# Patient Record
Sex: FEMALE | Race: BLACK OR AFRICAN AMERICAN | URBAN - METROPOLITAN AREA
[De-identification: names, ages, dates, MRNs, and addresses within clinical notes are randomized per-mention and may not be internally consistent; named-entity substitution may affect disease eponyms.]

---

## 2017-01-05 ENCOUNTER — OP HISTORICAL/CONVERTED ENCOUNTER (OUTPATIENT)
Dept: OTHER | Age: 52
End: 2017-01-05

## 2019-03-13 ENCOUNTER — HOSPITAL ENCOUNTER (OUTPATIENT)
Dept: CT IMAGING | Age: 54
Discharge: HOME | End: 2019-03-15
Payer: OTHER GOVERNMENT

## 2019-03-13 ENCOUNTER — HOSPITAL ENCOUNTER (OUTPATIENT)
Age: 54
Discharge: HOME | End: 2019-03-13
Payer: OTHER GOVERNMENT

## 2019-03-13 ENCOUNTER — OFFICE VISIT (OUTPATIENT)
Dept: PRIMARY CARE CLINIC | Age: 54
End: 2019-03-13
Payer: OTHER GOVERNMENT

## 2019-03-13 VITALS
BODY MASS INDEX: 32.22 KG/M2 | DIASTOLIC BLOOD PRESSURE: 82 MMHG | SYSTOLIC BLOOD PRESSURE: 124 MMHG | HEIGHT: 65 IN | WEIGHT: 193.4 LBS | HEART RATE: 87 BPM

## 2019-03-13 DIAGNOSIS — R93.5 ABNORMAL CT SCAN, PELVIS: Primary | ICD-10-CM

## 2019-03-13 DIAGNOSIS — Z13.1 ENCOUNTER FOR SCREENING FOR DIABETES MELLITUS: ICD-10-CM

## 2019-03-13 DIAGNOSIS — R10.2 PELVIC PRESSURE IN FEMALE: ICD-10-CM

## 2019-03-13 DIAGNOSIS — R10.30 LOWER ABDOMINAL PAIN: Primary | ICD-10-CM

## 2019-03-13 DIAGNOSIS — K64.9 BLEEDING HEMORRHOIDS: ICD-10-CM

## 2019-03-13 DIAGNOSIS — Z13.220 SCREENING FOR HYPERLIPIDEMIA: ICD-10-CM

## 2019-03-13 DIAGNOSIS — R10.30 LOWER ABDOMINAL PAIN: ICD-10-CM

## 2019-03-13 DIAGNOSIS — Z12.11 COLON CANCER SCREENING: ICD-10-CM

## 2019-03-13 DIAGNOSIS — Z23 NEED FOR VACCINATION: ICD-10-CM

## 2019-03-13 DIAGNOSIS — Z12.31 ENCOUNTER FOR SCREENING MAMMOGRAM FOR BREAST CANCER: ICD-10-CM

## 2019-03-13 DIAGNOSIS — D39.11 NEOPLASM OF UNCERTAIN BEHAVIOR OF OVARY, RIGHT: ICD-10-CM

## 2019-03-13 LAB
ABSOLUTE EOS #: 0.2 K/UL (ref 0–0.4)
ABSOLUTE IMMATURE GRANULOCYTE: NORMAL K/UL (ref 0–0.3)
ABSOLUTE LYMPH #: 2.3 K/UL (ref 1–4.8)
ABSOLUTE MONO #: 0.5 K/UL (ref 0.1–1.3)
BASOPHILS # BLD: 1 % (ref 0–2)
BASOPHILS ABSOLUTE: 0 K/UL (ref 0–0.2)
BILIRUBIN, POC: NORMAL
BLOOD URINE, POC: NORMAL
BUN BLDV-MCNC: 8 MG/DL (ref 6–20)
CHOLESTEROL, FASTING: 217 MG/DL
CHOLESTEROL/HDL RATIO: 2.8
CLARITY, POC: NORMAL
COLOR, POC: YELLOW
CREAT SERPL-MCNC: 0.65 MG/DL (ref 0.5–0.9)
DIFFERENTIAL TYPE: NORMAL
EOSINOPHILS RELATIVE PERCENT: 3 % (ref 0–4)
GFR AFRICAN AMERICAN: >60 ML/MIN
GFR NON-AFRICAN AMERICAN: >60 ML/MIN
GFR SERPL CREATININE-BSD FRML MDRD: NORMAL ML/MIN/{1.73_M2}
GFR SERPL CREATININE-BSD FRML MDRD: NORMAL ML/MIN/{1.73_M2}
GLUCOSE FASTING: 106 MG/DL (ref 70–99)
GLUCOSE URINE, POC: NORMAL
HCT VFR BLD CALC: 36.7 % (ref 36–46)
HDLC SERPL-MCNC: 77 MG/DL
HEMOGLOBIN: 12 G/DL (ref 12–16)
IMMATURE GRANULOCYTES: NORMAL %
KETONES, POC: NORMAL
LDL CHOLESTEROL: 127 MG/DL (ref 0–130)
LEUKOCYTE EST, POC: NORMAL
LYMPHOCYTES # BLD: 28 % (ref 24–44)
MCH RBC QN AUTO: 28.2 PG (ref 26–34)
MCHC RBC AUTO-ENTMCNC: 32.6 G/DL (ref 31–37)
MCV RBC AUTO: 86.6 FL (ref 80–100)
MONOCYTES # BLD: 5 % (ref 1–7)
NITRITE, POC: NORMAL
NRBC AUTOMATED: NORMAL PER 100 WBC
PDW BLD-RTO: 14.1 % (ref 11.5–14.9)
PH, POC: 5.5
PLATELET # BLD: 325 K/UL (ref 150–450)
PLATELET ESTIMATE: NORMAL
PMV BLD AUTO: 7.9 FL (ref 6–12)
PROTEIN, POC: NORMAL
RBC # BLD: 4.24 M/UL (ref 4–5.2)
RBC # BLD: NORMAL 10*6/UL
SEG NEUTROPHILS: 63 % (ref 36–66)
SEGMENTED NEUTROPHILS ABSOLUTE COUNT: 5.4 K/UL (ref 1.3–9.1)
SPECIFIC GRAVITY, POC: NORMAL
TRIGLYCERIDE, FASTING: 65 MG/DL
UROBILINOGEN, POC: NORMAL
VLDLC SERPL CALC-MCNC: ABNORMAL MG/DL (ref 1–30)
WBC # BLD: 8.4 K/UL (ref 3.5–11)
WBC # BLD: NORMAL 10*3/UL

## 2019-03-13 PROCEDURE — 80061 LIPID PANEL: CPT

## 2019-03-13 PROCEDURE — 82947 ASSAY GLUCOSE BLOOD QUANT: CPT

## 2019-03-13 PROCEDURE — 90471 IMMUNIZATION ADMIN: CPT | Performed by: PHYSICIAN ASSISTANT

## 2019-03-13 PROCEDURE — 2580000003 HC RX 258: Performed by: PHYSICIAN ASSISTANT

## 2019-03-13 PROCEDURE — 74177 CT ABD & PELVIS W/CONTRAST: CPT

## 2019-03-13 PROCEDURE — 85025 COMPLETE CBC W/AUTO DIFF WBC: CPT

## 2019-03-13 PROCEDURE — 36415 COLL VENOUS BLD VENIPUNCTURE: CPT

## 2019-03-13 PROCEDURE — 90715 TDAP VACCINE 7 YRS/> IM: CPT | Performed by: PHYSICIAN ASSISTANT

## 2019-03-13 PROCEDURE — 6360000004 HC RX CONTRAST MEDICATION: Performed by: PHYSICIAN ASSISTANT

## 2019-03-13 PROCEDURE — 84520 ASSAY OF UREA NITROGEN: CPT

## 2019-03-13 PROCEDURE — 99204 OFFICE O/P NEW MOD 45 MIN: CPT | Performed by: PHYSICIAN ASSISTANT

## 2019-03-13 PROCEDURE — 82565 ASSAY OF CREATININE: CPT

## 2019-03-13 PROCEDURE — 81003 URINALYSIS AUTO W/O SCOPE: CPT | Performed by: PHYSICIAN ASSISTANT

## 2019-03-13 RX ORDER — 0.9 % SODIUM CHLORIDE 0.9 %
80 INTRAVENOUS SOLUTION INTRAVENOUS ONCE
Status: COMPLETED | OUTPATIENT
Start: 2019-03-13 | End: 2019-03-13

## 2019-03-13 RX ORDER — SODIUM CHLORIDE 0.9 % (FLUSH) 0.9 %
10 SYRINGE (ML) INJECTION PRN
Status: DISCONTINUED | OUTPATIENT
Start: 2019-03-13 | End: 2019-03-16 | Stop reason: HOSPADM

## 2019-03-13 RX ADMIN — IOHEXOL 50 ML: 240 INJECTION, SOLUTION INTRATHECAL; INTRAVASCULAR; INTRAVENOUS; ORAL at 13:30

## 2019-03-13 RX ADMIN — SODIUM CHLORIDE 80 ML: 9 INJECTION, SOLUTION INTRAVENOUS at 13:30

## 2019-03-13 RX ADMIN — Medication 10 ML: at 13:30

## 2019-03-13 RX ADMIN — IOVERSOL 75 ML: 741 INJECTION INTRA-ARTERIAL; INTRAVENOUS at 13:30

## 2019-03-13 ASSESSMENT — ENCOUNTER SYMPTOMS
BLOOD IN STOOL: 1
CONSTIPATION: 0
DIARRHEA: 0
NAUSEA: 1
VOMITING: 1
BACK PAIN: 1
RESPIRATORY NEGATIVE: 1
ABDOMINAL PAIN: 1

## 2019-03-13 ASSESSMENT — PATIENT HEALTH QUESTIONNAIRE - PHQ9
SUM OF ALL RESPONSES TO PHQ9 QUESTIONS 1 & 2: 0
SUM OF ALL RESPONSES TO PHQ QUESTIONS 1-9: 0
SUM OF ALL RESPONSES TO PHQ QUESTIONS 1-9: 0
2. FEELING DOWN, DEPRESSED OR HOPELESS: 0
1. LITTLE INTEREST OR PLEASURE IN DOING THINGS: 0

## 2019-03-15 ENCOUNTER — APPOINTMENT (OUTPATIENT)
Dept: CT IMAGING | Age: 54
End: 2019-03-15
Payer: OTHER GOVERNMENT

## 2019-03-15 ENCOUNTER — HOSPITAL ENCOUNTER (OUTPATIENT)
Dept: ULTRASOUND IMAGING | Age: 54
Discharge: HOME | End: 2019-03-17
Payer: OTHER GOVERNMENT

## 2019-03-15 ENCOUNTER — HOSPITAL ENCOUNTER (EMERGENCY)
Age: 54
Discharge: HOME | End: 2019-03-15
Attending: EMERGENCY MEDICINE
Payer: OTHER GOVERNMENT

## 2019-03-15 ENCOUNTER — TELEPHONE (OUTPATIENT)
Dept: PRIMARY CARE CLINIC | Age: 54
End: 2019-03-15

## 2019-03-15 VITALS
TEMPERATURE: 98.8 F | OXYGEN SATURATION: 100 % | SYSTOLIC BLOOD PRESSURE: 132 MMHG | DIASTOLIC BLOOD PRESSURE: 83 MMHG | HEART RATE: 75 BPM | HEIGHT: 65 IN | WEIGHT: 192 LBS | RESPIRATION RATE: 19 BRPM | BODY MASS INDEX: 31.99 KG/M2

## 2019-03-15 DIAGNOSIS — D39.11 NEOPLASM OF UNCERTAIN BEHAVIOR OF OVARY, RIGHT: ICD-10-CM

## 2019-03-15 DIAGNOSIS — R10.30 LOWER ABDOMINAL PAIN: ICD-10-CM

## 2019-03-15 DIAGNOSIS — R93.5 ABNORMAL CT SCAN, PELVIS: ICD-10-CM

## 2019-03-15 DIAGNOSIS — R10.2 PELVIC PRESSURE IN FEMALE: ICD-10-CM

## 2019-03-15 DIAGNOSIS — N83.209 RUPTURED OVARIAN CYST: Primary | ICD-10-CM

## 2019-03-15 DIAGNOSIS — K59.00 CONSTIPATION, UNSPECIFIED CONSTIPATION TYPE: ICD-10-CM

## 2019-03-15 LAB
ABO/RH: NORMAL
ABSOLUTE EOS #: 0.2 K/UL (ref 0–0.4)
ABSOLUTE IMMATURE GRANULOCYTE: ABNORMAL K/UL (ref 0–0.3)
ABSOLUTE LYMPH #: 1.8 K/UL (ref 1–4.8)
ABSOLUTE MONO #: 0.3 K/UL (ref 0.1–1.3)
ALBUMIN SERPL-MCNC: 4 G/DL (ref 3.5–5.2)
ALBUMIN/GLOBULIN RATIO: ABNORMAL (ref 1–2.5)
ALP BLD-CCNC: 100 U/L (ref 35–104)
ALT SERPL-CCNC: 11 U/L (ref 5–33)
ANION GAP SERPL CALCULATED.3IONS-SCNC: 10 MMOL/L (ref 9–17)
ANTIBODY SCREEN: NEGATIVE
ARM BAND NUMBER: NORMAL
AST SERPL-CCNC: 12 U/L
BASOPHILS # BLD: 1 % (ref 0–2)
BASOPHILS ABSOLUTE: 0.1 K/UL (ref 0–0.2)
BILIRUB SERPL-MCNC: 0.51 MG/DL (ref 0.3–1.2)
BILIRUBIN URINE: NEGATIVE
BLOOD BANK COMMENT: NORMAL
BUN BLDV-MCNC: 8 MG/DL (ref 6–20)
BUN/CREAT BLD: ABNORMAL (ref 9–20)
CALCIUM SERPL-MCNC: 8.8 MG/DL (ref 8.6–10.4)
CHLORIDE BLD-SCNC: 104 MMOL/L (ref 98–107)
CO2: 25 MMOL/L (ref 20–31)
COLOR: YELLOW
COMMENT UA: NORMAL
CREAT SERPL-MCNC: 0.66 MG/DL (ref 0.5–0.9)
DIFFERENTIAL TYPE: ABNORMAL
EOSINOPHILS RELATIVE PERCENT: 3 % (ref 0–4)
EXPIRATION DATE: NORMAL
GFR AFRICAN AMERICAN: >60 ML/MIN
GFR NON-AFRICAN AMERICAN: >60 ML/MIN
GFR SERPL CREATININE-BSD FRML MDRD: ABNORMAL ML/MIN/{1.73_M2}
GFR SERPL CREATININE-BSD FRML MDRD: ABNORMAL ML/MIN/{1.73_M2}
GLUCOSE BLD-MCNC: 105 MG/DL (ref 70–99)
GLUCOSE URINE: NEGATIVE
HCT VFR BLD CALC: 35.5 % (ref 36–46)
HEMOGLOBIN: 11.8 G/DL (ref 12–16)
IMMATURE GRANULOCYTES: ABNORMAL %
KETONES, URINE: NEGATIVE
LEUKOCYTE ESTERASE, URINE: NEGATIVE
LIPASE: 23 U/L (ref 13–60)
LYMPHOCYTES # BLD: 24 % (ref 24–44)
MCH RBC QN AUTO: 28.6 PG (ref 26–34)
MCHC RBC AUTO-ENTMCNC: 33.2 G/DL (ref 31–37)
MCV RBC AUTO: 86.2 FL (ref 80–100)
MONOCYTES # BLD: 5 % (ref 1–7)
NITRITE, URINE: NEGATIVE
NRBC AUTOMATED: ABNORMAL PER 100 WBC
PDW BLD-RTO: 13.9 % (ref 11.5–14.9)
PH UA: 6.5 (ref 5–8)
PLATELET # BLD: 336 K/UL (ref 150–450)
PLATELET ESTIMATE: ABNORMAL
PMV BLD AUTO: 7.7 FL (ref 6–12)
POTASSIUM SERPL-SCNC: 3.8 MMOL/L (ref 3.7–5.3)
PROTEIN UA: NEGATIVE
RBC # BLD: 4.12 M/UL (ref 4–5.2)
RBC # BLD: ABNORMAL 10*6/UL
SEG NEUTROPHILS: 67 % (ref 36–66)
SEGMENTED NEUTROPHILS ABSOLUTE COUNT: 4.9 K/UL (ref 1.3–9.1)
SODIUM BLD-SCNC: 139 MMOL/L (ref 135–144)
SPECIFIC GRAVITY UA: 1.02 (ref 1–1.03)
TOTAL PROTEIN: 6.9 G/DL (ref 6.4–8.3)
TURBIDITY: CLEAR
URINE HGB: NEGATIVE
UROBILINOGEN, URINE: NORMAL
WBC # BLD: 7.3 K/UL (ref 3.5–11)
WBC # BLD: ABNORMAL 10*3/UL

## 2019-03-15 PROCEDURE — 85025 COMPLETE CBC W/AUTO DIFF WBC: CPT

## 2019-03-15 PROCEDURE — 86850 RBC ANTIBODY SCREEN: CPT

## 2019-03-15 PROCEDURE — 86901 BLOOD TYPING SEROLOGIC RH(D): CPT

## 2019-03-15 PROCEDURE — 76830 TRANSVAGINAL US NON-OB: CPT

## 2019-03-15 PROCEDURE — 99284 EMERGENCY DEPT VISIT MOD MDM: CPT

## 2019-03-15 PROCEDURE — 6360000004 HC RX CONTRAST MEDICATION: Performed by: EMERGENCY MEDICINE

## 2019-03-15 PROCEDURE — 36415 COLL VENOUS BLD VENIPUNCTURE: CPT

## 2019-03-15 PROCEDURE — 76856 US EXAM PELVIC COMPLETE: CPT

## 2019-03-15 PROCEDURE — 81003 URINALYSIS AUTO W/O SCOPE: CPT

## 2019-03-15 PROCEDURE — 2580000003 HC RX 258: Performed by: EMERGENCY MEDICINE

## 2019-03-15 PROCEDURE — 80053 COMPREHEN METABOLIC PANEL: CPT

## 2019-03-15 PROCEDURE — 83690 ASSAY OF LIPASE: CPT

## 2019-03-15 PROCEDURE — 96374 THER/PROPH/DIAG INJ IV PUSH: CPT

## 2019-03-15 PROCEDURE — 96375 TX/PRO/DX INJ NEW DRUG ADDON: CPT

## 2019-03-15 PROCEDURE — 74177 CT ABD & PELVIS W/CONTRAST: CPT

## 2019-03-15 PROCEDURE — 86900 BLOOD TYPING SEROLOGIC ABO: CPT

## 2019-03-15 PROCEDURE — 6360000002 HC RX W HCPCS: Performed by: EMERGENCY MEDICINE

## 2019-03-15 RX ORDER — DOXYCYCLINE 100 MG/1
100 TABLET ORAL 2 TIMES DAILY
Qty: 20 TABLET | Refills: 0 | Status: SHIPPED | OUTPATIENT
Start: 2019-03-15 | End: 2019-03-25

## 2019-03-15 RX ORDER — SODIUM CHLORIDE 0.9 % (FLUSH) 0.9 %
10 SYRINGE (ML) INJECTION PRN
Status: DISCONTINUED | OUTPATIENT
Start: 2019-03-15 | End: 2019-03-15 | Stop reason: HOSPADM

## 2019-03-15 RX ORDER — DOCUSATE SODIUM 100 MG/1
100 CAPSULE, LIQUID FILLED ORAL 2 TIMES DAILY
Qty: 30 CAPSULE | Refills: 0 | Status: SHIPPED | OUTPATIENT
Start: 2019-03-15 | End: 2019-03-27

## 2019-03-15 RX ORDER — 0.9 % SODIUM CHLORIDE 0.9 %
80 INTRAVENOUS SOLUTION INTRAVENOUS ONCE
Status: COMPLETED | OUTPATIENT
Start: 2019-03-15 | End: 2019-03-15

## 2019-03-15 RX ORDER — ONDANSETRON 2 MG/ML
4 INJECTION INTRAMUSCULAR; INTRAVENOUS ONCE
Status: COMPLETED | OUTPATIENT
Start: 2019-03-15 | End: 2019-03-15

## 2019-03-15 RX ORDER — MORPHINE SULFATE 4 MG/ML
4 INJECTION, SOLUTION INTRAMUSCULAR; INTRAVENOUS ONCE
Status: COMPLETED | OUTPATIENT
Start: 2019-03-15 | End: 2019-03-15

## 2019-03-15 RX ADMIN — IOVERSOL 75 ML: 741 INJECTION INTRA-ARTERIAL; INTRAVENOUS at 15:06

## 2019-03-15 RX ADMIN — SODIUM CHLORIDE 80 ML: 9 INJECTION, SOLUTION INTRAVENOUS at 15:05

## 2019-03-15 RX ADMIN — MORPHINE SULFATE 4 MG: 4 INJECTION INTRAVENOUS at 14:26

## 2019-03-15 RX ADMIN — Medication 10 ML: at 15:05

## 2019-03-15 RX ADMIN — ONDANSETRON 4 MG: 2 INJECTION INTRAMUSCULAR; INTRAVENOUS at 14:26

## 2019-03-15 ASSESSMENT — PAIN DESCRIPTION - ORIENTATION: ORIENTATION: MID;LOWER

## 2019-03-15 ASSESSMENT — ENCOUNTER SYMPTOMS
BACK PAIN: 0
COUGH: 0
SORE THROAT: 0
NAUSEA: 0
COLOR CHANGE: 0
DIARRHEA: 0
TROUBLE SWALLOWING: 0
SHORTNESS OF BREATH: 0
CONSTIPATION: 0
VOMITING: 0
BLOOD IN STOOL: 0
ABDOMINAL PAIN: 1

## 2019-03-15 ASSESSMENT — PAIN DESCRIPTION - PROGRESSION: CLINICAL_PROGRESSION: GRADUALLY WORSENING

## 2019-03-15 ASSESSMENT — PAIN SCALES - GENERAL
PAINLEVEL_OUTOF10: 4
PAINLEVEL_OUTOF10: 6

## 2019-03-15 ASSESSMENT — PAIN DESCRIPTION - FREQUENCY: FREQUENCY: INTERMITTENT

## 2019-03-15 ASSESSMENT — PAIN DESCRIPTION - DESCRIPTORS: DESCRIPTORS: PRESSURE;HEAVINESS;TIGHTNESS

## 2019-03-15 ASSESSMENT — PAIN DESCRIPTION - PAIN TYPE: TYPE: ACUTE PAIN

## 2019-03-15 ASSESSMENT — PAIN DESCRIPTION - LOCATION: LOCATION: ABDOMEN

## 2019-03-15 ASSESSMENT — PAIN DESCRIPTION - ONSET: ONSET: ON-GOING

## 2019-03-27 ENCOUNTER — OFFICE VISIT (OUTPATIENT)
Dept: PRIMARY CARE CLINIC | Age: 54
End: 2019-03-27
Payer: OTHER GOVERNMENT

## 2019-03-27 ENCOUNTER — HOSPITAL ENCOUNTER (OUTPATIENT)
Dept: GENERAL RADIOLOGY | Age: 54
Discharge: HOME | End: 2019-03-29
Payer: OTHER GOVERNMENT

## 2019-03-27 ENCOUNTER — HOSPITAL ENCOUNTER (OUTPATIENT)
Age: 54
Discharge: HOME | End: 2019-03-29
Payer: OTHER GOVERNMENT

## 2019-03-27 VITALS
BODY MASS INDEX: 32.78 KG/M2 | HEART RATE: 84 BPM | WEIGHT: 197 LBS | SYSTOLIC BLOOD PRESSURE: 132 MMHG | DIASTOLIC BLOOD PRESSURE: 86 MMHG

## 2019-03-27 DIAGNOSIS — R23.2 HOT FLASHES: ICD-10-CM

## 2019-03-27 DIAGNOSIS — N83.201 CYST OF RIGHT OVARY: ICD-10-CM

## 2019-03-27 DIAGNOSIS — N83.209 RUPTURED OVARIAN CYST: ICD-10-CM

## 2019-03-27 DIAGNOSIS — M53.3 COCCYDYNIA: Primary | ICD-10-CM

## 2019-03-27 DIAGNOSIS — M53.3 COCCYDYNIA: ICD-10-CM

## 2019-03-27 DIAGNOSIS — R61 NIGHT SWEATS: ICD-10-CM

## 2019-03-27 PROCEDURE — 99214 OFFICE O/P EST MOD 30 MIN: CPT | Performed by: PHYSICIAN ASSISTANT

## 2019-03-27 PROCEDURE — 72220 X-RAY EXAM SACRUM TAILBONE: CPT

## 2019-03-27 RX ORDER — CELECOXIB 200 MG/1
200 CAPSULE ORAL DAILY
Qty: 60 CAPSULE | Refills: 0 | Status: SHIPPED | OUTPATIENT
Start: 2019-03-27 | End: 2022-07-29

## 2019-03-27 ASSESSMENT — ENCOUNTER SYMPTOMS
BLOOD IN STOOL: 0
VOMITING: 0
CONSTIPATION: 0
ABDOMINAL PAIN: 0
ABDOMINAL DISTENTION: 0
RESPIRATORY NEGATIVE: 1
NAUSEA: 0
DIARRHEA: 0
BACK PAIN: 0

## 2019-03-28 ENCOUNTER — TELEPHONE (OUTPATIENT)
Dept: PRIMARY CARE CLINIC | Age: 54
End: 2019-03-28

## 2019-03-29 ENCOUNTER — HOSPITAL ENCOUNTER (OUTPATIENT)
Age: 54
Discharge: HOME | End: 2019-03-29
Payer: OTHER GOVERNMENT

## 2019-03-29 DIAGNOSIS — R61 NIGHT SWEATS: ICD-10-CM

## 2019-03-29 DIAGNOSIS — R23.2 HOT FLASHES: ICD-10-CM

## 2019-03-29 LAB
ABSOLUTE EOS #: 0.2 K/UL (ref 0–0.4)
ABSOLUTE IMMATURE GRANULOCYTE: NORMAL K/UL (ref 0–0.3)
ABSOLUTE LYMPH #: 1.9 K/UL (ref 1–4.8)
ABSOLUTE MONO #: 0.3 K/UL (ref 0.1–1.3)
BASOPHILS # BLD: 1 % (ref 0–2)
BASOPHILS ABSOLUTE: 0.1 K/UL (ref 0–0.2)
DIFFERENTIAL TYPE: NORMAL
EOSINOPHILS RELATIVE PERCENT: 4 % (ref 0–4)
FOLLICLE STIMULATING HORMONE: 45.8 U/L (ref 1.7–21.5)
HCT VFR BLD CALC: 37 % (ref 36–46)
HEMOGLOBIN: 12.4 G/DL (ref 12–16)
IMMATURE GRANULOCYTES: NORMAL %
LH: 30.7 U/L (ref 1–95.6)
LYMPHOCYTES # BLD: 30 % (ref 24–44)
MCH RBC QN AUTO: 28.9 PG (ref 26–34)
MCHC RBC AUTO-ENTMCNC: 33.4 G/DL (ref 31–37)
MCV RBC AUTO: 86.3 FL (ref 80–100)
MONOCYTES # BLD: 5 % (ref 1–7)
NRBC AUTOMATED: NORMAL PER 100 WBC
PDW BLD-RTO: 14.3 % (ref 11.5–14.9)
PLATELET # BLD: 325 K/UL (ref 150–450)
PLATELET ESTIMATE: NORMAL
PMV BLD AUTO: 7.8 FL (ref 6–12)
RBC # BLD: 4.29 M/UL (ref 4–5.2)
RBC # BLD: NORMAL 10*6/UL
SEG NEUTROPHILS: 60 % (ref 36–66)
SEGMENTED NEUTROPHILS ABSOLUTE COUNT: 3.7 K/UL (ref 1.3–9.1)
TSH SERPL DL<=0.05 MIU/L-ACNC: 1.63 MIU/L (ref 0.3–5)
WBC # BLD: 6.2 K/UL (ref 3.5–11)
WBC # BLD: NORMAL 10*3/UL

## 2019-03-29 PROCEDURE — 83002 ASSAY OF GONADOTROPIN (LH): CPT

## 2019-03-29 PROCEDURE — 83001 ASSAY OF GONADOTROPIN (FSH): CPT

## 2019-03-29 PROCEDURE — 36415 COLL VENOUS BLD VENIPUNCTURE: CPT

## 2019-03-29 PROCEDURE — 84443 ASSAY THYROID STIM HORMONE: CPT

## 2019-03-29 PROCEDURE — 85025 COMPLETE CBC W/AUTO DIFF WBC: CPT

## 2019-04-30 ENCOUNTER — HOSPITAL ENCOUNTER (EMERGENCY)
Age: 54
Discharge: HOME | End: 2019-05-01
Attending: EMERGENCY MEDICINE
Payer: OTHER GOVERNMENT

## 2019-04-30 DIAGNOSIS — R10.32 LEFT LOWER QUADRANT PAIN: Primary | ICD-10-CM

## 2019-04-30 PROCEDURE — 80053 COMPREHEN METABOLIC PANEL: CPT

## 2019-04-30 PROCEDURE — 83690 ASSAY OF LIPASE: CPT

## 2019-04-30 PROCEDURE — 85025 COMPLETE CBC W/AUTO DIFF WBC: CPT

## 2019-04-30 PROCEDURE — 81003 URINALYSIS AUTO W/O SCOPE: CPT

## 2019-04-30 PROCEDURE — 99284 EMERGENCY DEPT VISIT MOD MDM: CPT

## 2019-04-30 RX ORDER — KETOROLAC TROMETHAMINE 30 MG/ML
30 INJECTION, SOLUTION INTRAMUSCULAR; INTRAVENOUS ONCE
Status: COMPLETED | OUTPATIENT
Start: 2019-04-30 | End: 2019-05-01

## 2019-04-30 RX ORDER — ONDANSETRON 2 MG/ML
4 INJECTION INTRAMUSCULAR; INTRAVENOUS ONCE
Status: COMPLETED | OUTPATIENT
Start: 2019-04-30 | End: 2019-05-01

## 2019-04-30 ASSESSMENT — PAIN DESCRIPTION - PAIN TYPE: TYPE: ACUTE PAIN

## 2019-04-30 ASSESSMENT — PAIN DESCRIPTION - LOCATION: LOCATION: ABDOMEN

## 2019-04-30 ASSESSMENT — PAIN SCALES - GENERAL: PAINLEVEL_OUTOF10: 10

## 2019-05-01 ENCOUNTER — APPOINTMENT (OUTPATIENT)
Dept: CT IMAGING | Age: 54
End: 2019-05-01
Payer: OTHER GOVERNMENT

## 2019-05-01 VITALS
OXYGEN SATURATION: 99 % | HEART RATE: 88 BPM | BODY MASS INDEX: 31.65 KG/M2 | TEMPERATURE: 98.8 F | HEIGHT: 65 IN | RESPIRATION RATE: 16 BRPM | DIASTOLIC BLOOD PRESSURE: 88 MMHG | WEIGHT: 190 LBS | SYSTOLIC BLOOD PRESSURE: 148 MMHG

## 2019-05-01 LAB
ABSOLUTE EOS #: 0.3 K/UL (ref 0–0.4)
ABSOLUTE IMMATURE GRANULOCYTE: NORMAL K/UL (ref 0–0.3)
ABSOLUTE LYMPH #: 2.5 K/UL (ref 1–4.8)
ABSOLUTE MONO #: 0.4 K/UL (ref 0.1–1.3)
ALBUMIN SERPL-MCNC: 4.2 G/DL (ref 3.5–5.2)
ALBUMIN/GLOBULIN RATIO: ABNORMAL (ref 1–2.5)
ALP BLD-CCNC: 103 U/L (ref 35–104)
ALT SERPL-CCNC: 12 U/L (ref 5–33)
ANION GAP SERPL CALCULATED.3IONS-SCNC: 12 MMOL/L (ref 9–17)
AST SERPL-CCNC: 14 U/L
BASOPHILS # BLD: 1 % (ref 0–2)
BASOPHILS ABSOLUTE: 0.1 K/UL (ref 0–0.2)
BILIRUB SERPL-MCNC: 0.15 MG/DL (ref 0.3–1.2)
BILIRUBIN URINE: NEGATIVE
BUN BLDV-MCNC: 11 MG/DL (ref 6–20)
BUN/CREAT BLD: ABNORMAL (ref 9–20)
CALCIUM SERPL-MCNC: 8.9 MG/DL (ref 8.6–10.4)
CHLORIDE BLD-SCNC: 105 MMOL/L (ref 98–107)
CO2: 25 MMOL/L (ref 20–31)
COLOR: YELLOW
COMMENT UA: NORMAL
CREAT SERPL-MCNC: 0.86 MG/DL (ref 0.5–0.9)
DIFFERENTIAL TYPE: NORMAL
EOSINOPHILS RELATIVE PERCENT: 4 % (ref 0–4)
GFR AFRICAN AMERICAN: >60 ML/MIN
GFR NON-AFRICAN AMERICAN: >60 ML/MIN
GFR SERPL CREATININE-BSD FRML MDRD: ABNORMAL ML/MIN/{1.73_M2}
GFR SERPL CREATININE-BSD FRML MDRD: ABNORMAL ML/MIN/{1.73_M2}
GLUCOSE BLD-MCNC: 105 MG/DL (ref 70–99)
GLUCOSE URINE: NEGATIVE
HCT VFR BLD CALC: 39 % (ref 36–46)
HEMOGLOBIN: 12.9 G/DL (ref 12–16)
IMMATURE GRANULOCYTES: NORMAL %
KETONES, URINE: NEGATIVE
LEUKOCYTE ESTERASE, URINE: NEGATIVE
LIPASE: 28 U/L (ref 13–60)
LYMPHOCYTES # BLD: 31 % (ref 24–44)
MCH RBC QN AUTO: 28.4 PG (ref 26–34)
MCHC RBC AUTO-ENTMCNC: 33 G/DL (ref 31–37)
MCV RBC AUTO: 86.1 FL (ref 80–100)
MONOCYTES # BLD: 5 % (ref 1–7)
NITRITE, URINE: NEGATIVE
NRBC AUTOMATED: NORMAL PER 100 WBC
PDW BLD-RTO: 14.3 % (ref 11.5–14.9)
PH UA: 6.5 (ref 5–8)
PLATELET # BLD: 333 K/UL (ref 150–450)
PLATELET ESTIMATE: NORMAL
PMV BLD AUTO: 8 FL (ref 6–12)
POTASSIUM SERPL-SCNC: 4 MMOL/L (ref 3.7–5.3)
PROTEIN UA: NEGATIVE
RBC # BLD: 4.53 M/UL (ref 4–5.2)
RBC # BLD: NORMAL 10*6/UL
SEG NEUTROPHILS: 59 % (ref 36–66)
SEGMENTED NEUTROPHILS ABSOLUTE COUNT: 4.9 K/UL (ref 1.3–9.1)
SODIUM BLD-SCNC: 142 MMOL/L (ref 135–144)
SPECIFIC GRAVITY UA: 1.02 (ref 1–1.03)
TOTAL PROTEIN: 7.7 G/DL (ref 6.4–8.3)
TURBIDITY: CLEAR
URINE HGB: NEGATIVE
UROBILINOGEN, URINE: NORMAL
WBC # BLD: 8.3 K/UL (ref 3.5–11)
WBC # BLD: NORMAL 10*3/UL

## 2019-05-01 PROCEDURE — 6360000002 HC RX W HCPCS: Performed by: EMERGENCY MEDICINE

## 2019-05-01 PROCEDURE — 2580000003 HC RX 258: Performed by: EMERGENCY MEDICINE

## 2019-05-01 PROCEDURE — 96374 THER/PROPH/DIAG INJ IV PUSH: CPT

## 2019-05-01 PROCEDURE — 6370000000 HC RX 637 (ALT 250 FOR IP): Performed by: EMERGENCY MEDICINE

## 2019-05-01 PROCEDURE — 74177 CT ABD & PELVIS W/CONTRAST: CPT

## 2019-05-01 PROCEDURE — 96375 TX/PRO/DX INJ NEW DRUG ADDON: CPT

## 2019-05-01 PROCEDURE — 6360000004 HC RX CONTRAST MEDICATION: Performed by: EMERGENCY MEDICINE

## 2019-05-01 RX ORDER — HYDROCODONE BITARTRATE AND ACETAMINOPHEN 5; 325 MG/1; MG/1
1 TABLET ORAL EVERY 6 HOURS PRN
Qty: 12 TABLET | Refills: 0 | Status: SHIPPED | OUTPATIENT
Start: 2019-05-01 | End: 2019-05-04

## 2019-05-01 RX ORDER — 0.9 % SODIUM CHLORIDE 0.9 %
80 INTRAVENOUS SOLUTION INTRAVENOUS ONCE
Status: COMPLETED | OUTPATIENT
Start: 2019-05-01 | End: 2019-05-01

## 2019-05-01 RX ORDER — HYDROCODONE BITARTRATE AND ACETAMINOPHEN 5; 325 MG/1; MG/1
1 TABLET ORAL ONCE
Status: COMPLETED | OUTPATIENT
Start: 2019-05-01 | End: 2019-05-01

## 2019-05-01 RX ORDER — SODIUM CHLORIDE 0.9 % (FLUSH) 0.9 %
10 SYRINGE (ML) INJECTION PRN
Status: DISCONTINUED | OUTPATIENT
Start: 2019-05-01 | End: 2019-05-01 | Stop reason: HOSPADM

## 2019-05-01 RX ADMIN — Medication 10 ML: at 00:55

## 2019-05-01 RX ADMIN — ONDANSETRON 4 MG: 2 INJECTION INTRAMUSCULAR; INTRAVENOUS at 00:41

## 2019-05-01 RX ADMIN — HYDROCODONE BITARTRATE AND ACETAMINOPHEN 1 TABLET: 5; 325 TABLET ORAL at 01:53

## 2019-05-01 RX ADMIN — SODIUM CHLORIDE 80 ML: 9 INJECTION, SOLUTION INTRAVENOUS at 00:55

## 2019-05-01 RX ADMIN — IOVERSOL 75 ML: 741 INJECTION INTRA-ARTERIAL; INTRAVENOUS at 00:55

## 2019-05-01 RX ADMIN — KETOROLAC TROMETHAMINE 30 MG: 30 INJECTION, SOLUTION INTRAMUSCULAR; INTRAVENOUS at 00:43

## 2019-05-01 ASSESSMENT — PAIN SCALES - GENERAL
PAINLEVEL_OUTOF10: 8
PAINLEVEL_OUTOF10: 9

## 2019-05-01 NOTE — ED PROVIDER NOTES
eMERGENCY dEPARTMENT eNCOUnter    Pt Name: Angelina Leiva  MRN: [id number]  [place] 1965  Date of evaluation: 4/30/19  CHIEF COMPLAINT       Chief Complaint   Patient presents with    Abdominal Pain    Emesis    Nausea     HISTORY OF PRESENT ILLNESS   HPI  HISTORY OF PRESENT ILLNESS:  Past medical history of ruptured ovarian cysts presents for chief complaint of left lower quadrant abdominal pain that feels similar to her cyst.  Denies any fevers is associated with vomiting. No vaginal bleeding or discharge. No diarrhea or bright red blood per rectum. No fevers or chills. No chest pain shortness of breath. Severity is moderate. No aggravating or relieving factors. Timing is one day. Course is constant.   Context is history of ruptured cyst  -----------------------  -----------------------  REVIEW OF SYSTEMS  ED Caveat: [none]  Gen:  No fever, no chills  CV: No CP, no palpitations  Resp: No SOB, no respiratory distress  GI: No V/D, + abd pain  : No dysuria, no increased frequency  Skin: No rash, no purulent lesions  Eyes: No blurry vision, No double vision  MSK: No back pain, no joint pain  Neuro: No HA, no sensation changes  Psych: No SI/HI  -----------------------  -----------------------  ALLERGIES  -per nursing records, reviewed    PAST MEDICAL HISTORY  -See HPI    SOCIAL HISTORY  -No daily drinking, no IV drugs  -----------------------  -----------------------  PHYSICAL EXAM  Gen: Alert, no acute distress  Skin: Warm, no rashes  Head: Normocephalic, atraumatic  Neck: No midline tenderness, no nuchal rigidity  Eye: EOMI, PERRLA, normal conjunctiva  ENT: Mucous membranes moist, no pharyngeal erythema  CV: Normal rate, no rubs  Resp: Respirations unlabored, lungs clear to auscultation  GI: Soft, non distended, no large abdominal masses, left lower quadrant tenderness without rebound or guarding  MSK: No midline back pain, no large joint effusions  Neuro: Alert and oriented, no focal neurological deficits observed  Psych: Cooperative, appropriate mood and affect  -----------------------  -----------------------  MEDICAL DECISION MAKING  Differential Diagnosis:  - Consideration is given for  appendicitis, cholecystitis,  diverticulitis, SBO, urinary tract infection, nephrolithiasis, pancreatitis, dissection, ischemia to reproductive organs, STD, ischemic colitis, perforation, intra abdominal bleeding, GI bleed, DKA, ACS,     -  #Impression/Plan:  - Clinically patient's presentation is most consistent with  ruptured ovarian cyst.  However given patient's age and location of pain and her concerns will get laboratory testing imaging. Disposition will be based off this and symptomatically improvement. Clinically patient is very well-appearing at this time and have low clinical suspicion of acute life threatening abnormality  -  ##Reevaluation/Conversations on care:  - pt feeling better, darrel po, will follow up with ob/gyn. Clinically not consistent with torsion. Pt is pain free  -   ##Diagnosis:  -Abdominal pain  -  -----------------------  -----------------------  Elissa Rivas MD, [facility]  Emergency Medicine Attending  Questions? Please contact my cell phone anytime. [phone]  *This charting supersedes any ED resident or staff charting and was written using speech recognition software  PASTMEDICAL HISTORY     Past Medical History:   Diagnosis Date    Fibroid tumor      SURGICAL HISTORY       Past Surgical History:   Procedure Laterality Date    BREAST ENHANCEMENT SURGERY      HYSTERECTOMY      unsure of total    LASIK       CURRENT MEDICATIONS       Previous Medications    CELECOXIB (CELEBREX) 200 MG CAPSULE    Take 1 capsule by mouth daily    DOXYLAMINE SUCCINATE, SLEEP, (UNISOM PO)    Take by mouth as needed (sleep)     ALLERGIES     has No Known Allergies. FAMILY HISTORY     indicated that the status of her mother is unknown. She indicated that the status of her father is unknown.  She indicated that the status of her sister is unknown. SOCIAL HISTORY       Social History     Tobacco Use    Smoking status: Never Smoker    Smokeless tobacco: Never Used   Substance Use Topics    Alcohol use: Not Currently    Drug use: Never     PHYSICAL EXAM     INITIAL VITALS: BP (!) 152/94   Pulse 85   Temp 98.9 °F (37.2 °C) (Oral)   Resp 16   Ht 5' 5\" (1.651 m)   Wt 190 lb (86.2 kg)   SpO2 99%   BMI 31.62 kg/m²    Physical Exam    MEDICAL DECISION MAKING:            Labs Reviewed   COMPREHENSIVE METABOLIC PANEL W/ REFLEX TO MG FOR LOW K - Abnormal; Notable for the following components:       Result Value    Glucose 105 (*)     Total Bilirubin 0.15 (*)     All other components within normal limits   CBC WITH AUTO DIFFERENTIAL   LIPASE   URINE RT REFLEX TO CULTURE     EMERGENCY DEPARTMENTCOURSE:         Vitals:    Vitals:    04/30/19 2310 04/30/19 2317   BP: (!) 156/104 (!) 152/94   Pulse: 81 85   Resp: 16 16   Temp: 98.9 °F (37.2 °C)    TempSrc: Oral    SpO2: 98% 99%   Weight: 190 lb (86.2 kg)    Height: 5' 5\" (1.651 m)        The patient was given the following medications while in the emergency department:  Orders Placed This Encounter   Medications    ketorolac (TORADOL) injection 30 mg    ondansetron (ZOFRAN) injection 4 mg    0.9 % sodium chloride bolus    sodium chloride flush 0.9 % injection 10 mL    ioversol (OPTIRAY) 74 % injection 75 mL    HYDROcodone-acetaminophen (NORCO) 5-325 MG per tablet     Sig: Take 1 tablet by mouth every 6 hours as needed for Pain for up to 3 days. Intended supply: 3 days. Take lowest dose possible to manage pain     Dispense:  12 tablet     Refill:  0     CONSULTS:  None    FINAL IMPRESSION      1. Left lower quadrant pain          DISPOSITION/PLAN   DISPOSITION Decision To Discharge 05/01/2019 01:41:45 AM      PATIENT REFERRED TO:  No follow-up provider specified.   DISCHARGE MEDICATIONS:  New Prescriptions    HYDROCODONE-ACETAMINOPHEN (NORCO) 5-325 MG PER TABLET    Take 1 tablet by mouth every 6 hours as needed for Pain for up to 3 days. Intended supply: 3 days.  Take lowest dose possible to manage pain     Kayla Brunner MD  Attending Emergency Physician                   Felipa Peña MD  05/01/19 0045

## 2019-05-14 ENCOUNTER — OFFICE VISIT (OUTPATIENT)
Dept: OBGYN CLINIC | Age: 54
End: 2019-05-14
Payer: OTHER GOVERNMENT

## 2019-05-14 VITALS
HEIGHT: 65 IN | DIASTOLIC BLOOD PRESSURE: 80 MMHG | SYSTOLIC BLOOD PRESSURE: 114 MMHG | BODY MASS INDEX: 32.82 KG/M2 | WEIGHT: 197 LBS

## 2019-05-14 DIAGNOSIS — Z00.00 PREVENTATIVE HEALTH CARE: ICD-10-CM

## 2019-05-14 DIAGNOSIS — N83.201 CYST OF RIGHT OVARY: Primary | ICD-10-CM

## 2019-05-14 PROCEDURE — 99203 OFFICE O/P NEW LOW 30 MIN: CPT | Performed by: OBSTETRICS & GYNECOLOGY

## 2019-05-14 NOTE — PROGRESS NOTES
Maria Del Carmen Ibanez  2019      Maria Del Carmen Ibanez is a 48 y.o. female  (Vag x 2)      The patient was seen today. She was here to follow-up regarding her labs and diagnostics ordered at her last visit for the diagnosis of:    ICD-10-CM    1. Cyst of right ovary N83.201    2. [facility] care Z00.00 CHA DIGITAL SCREEN W OR WO CAD BILATERAL       She does not have any specific chief complaint today. Her bowels are regular and she is voiding without difficulty. The pt had what seemed to be a ruptured a hemorrhagic corpus luteal cyst. FU sono and CT scan shows resolution of cyst and fluid in pelvis. No cc today. Wishes fu only in 6 months. Pt denies any family hx of ovarian cancer . No weight loss or early satiety. She declined laparoscopy with washings or tumor markers. Past Medical History:   Diagnosis Date    Fibroid tumor          Past Surgical History:   Procedure Laterality Date    BREAST ENHANCEMENT SURGERY      HYSTERECTOMY      unsure of total    LASIK           Family History   Problem Relation Age of Onset    Diabetes Mother     Lung Cancer Father         mets to liver    Alcohol Abuse Father     Diabetes Sister          Social History     Tobacco Use    Smoking status: Never Smoker    Smokeless tobacco: Never Used   Substance Use Topics    Alcohol use: Not Currently    Drug use: Never         MEDICATIONS:  Current Outpatient Medications   Medication Sig Dispense Refill    Doxylamine Succinate, Sleep, (UNISOM PO) Take by mouth as needed (sleep)      celecoxib (CELEBREX) 200 MG capsule Take 1 capsule by mouth daily 60 capsule 0     No current facility-administered medications for this visit. ALLERGIES:  Allergies as of 2019    (No Known Allergies)         Blood pressure 114/80, height 5' 5\" (1.651 m), weight 197 lb (89.4 kg), not currently breastfeeding. Abdomen: Soft non-tender; good bowel sounds. No guarding, rebound or rigidity.  No CVA tenderness bilaterally. Extremities: No calf tenderness, DTR 2/4, and No edema bilaterally    Pelvic: Declined         Diagnostics:  Ct Abdomen Pelvis W Iv Contrast Additional Contrast? None    Result Date: 5/1/2019  EXAMINATION: CT OF THE ABDOMEN AND PELVIS WITH CONTRAST 5/1/2019 12:42 am TECHNIQUE: CT of the abdomen and pelvis was performed with the administration of intravenous contrast. Multiplanar reformatted images are provided for review. Dose modulation, iterative reconstruction, and/or weight based adjustment of the mA/kV was utilized to reduce the radiation dose to as low as reasonably achievable. COMPARISON: 03/15/2019 HISTORY: ORDERING SYSTEM PROVIDED HISTORY: llq abd pain TECHNOLOGIST PROVIDED HISTORY: Ordering Physician Provided Reason for Exam: patient has complaints of severe abdominal pain, patient states she has a hx of ovarian cysts that have ruptured in the past Acuity: Acute Type of Exam: Unknown Relevant Medical/Surgical History: surgeries to area of interst include hysterectomy FINDINGS: Lower Chest: There are bilateral breast implants. Lung bases are clear. The heart size is normal. Organs: The liver, spleen, pancreas, adrenal glands and kidneys are normal. The gallbladder is partially contracted. There are no calcified gallstones. GI/Bowel: Stomach is mildly distended. Small bowel loops are normal in caliber. The appendix is normal.  There is some high attenuation debris within the normal caliber appendix. Mild stool load in the colon. No bowel obstruction. Pelvis: The uterus is surgically absent. There are clustered cysts in the pelvis extending from the cul-de-sac into the right greater than left adnexal regions. The largest cyst in the cul-de-sac measures up to 4.2 cm. There is no significant free fluid. Peritoneum/Retroperitoneum: There is no free air or adenopathy. Bones/Soft Tissues: No acute bone or soft tissue abnormality.      There are clustered cysts likely of ovarian origin in the pelvis centered in the cul-de-sac extending to the right greater than left adnexal regions. Largest cyst measures up to 4.2 cm in the cul-de-sac. There is no definite free fluid. Overall, findings have improved from 03/15/2019. RECOMMENDATIONS: 4.2 cm benign appearing ovarian cyst Recommend follow-up pelvic US in 6-12 months. Reference: J Am Jackson Radiol 2013;10:[id number]     EXAMINATION:   CT OF THE ABDOMEN AND PELVIS WITH CONTRAST 3/15/2019 2:46 pm       TECHNIQUE:   CT of the abdomen and pelvis was performed with the administration of   intravenous contrast. Multiplanar reformatted images are provided for review. Dose modulation, iterative reconstruction, and/or weight based adjustment of   the mA/kV was utilized to reduce the radiation dose to as low as reasonably   achievable.       COMPARISON:   Pelvic ultrasound today.  CT 03/13/2019.       HISTORY:   ORDERING SYSTEM PROVIDED HISTORY: abd pain, ?pelvic fluid on US   TECHNOLOGIST PROVIDED HISTORY:       Ordering Physician Provided Reason for Exam: pt states lower pelvic pain, pt   states scan done 3/13/19 for same issues. Acuity: Acute   Type of Exam: Initial       FINDINGS:   Lower Chest: The lung bases are clear.       Organs: The liver, gallbladder, pancreas, spleen, adrenals and kidneys appear   unremarkable.       GI/Bowel: No bowel dilatation or wall thickening identified.  Previously   administered oral contrast is present in the colon.  Normal appendix.       Pelvis: Heterogeneous appearing fluid is present in the pelvis, which appears   unchanged and not clearly distinguishable from the right ovary.  The left   ovary appears grossly unremarkable. [place] uterus is surgically absent.       Peritoneum/Retroperitoneum: No free air.  No abdominal ascites.  No   lymphadenopathy.  The aorta is normal in caliber.  The visceral branches are   patent.       Bones/Soft Tissues: No acute osseous or acute soft tissue abnormality   identified.         Impression   Complex small volume pelvic free fluid again demonstrated without appreciable   change from CT exam 2 days ago.  This may represent organizing blood products   from hemorrhagic cyst rupture.  An underlying inflammatory process or   infection is not excluded in the appropriate clinical setting.  No new   finding otherwise appreciated. EXAMINATION:   PELVIC ULTRASOUND       3/15/2019       TECHNIQUE:   Transabdominal and transvaginal pelvic ultrasound was performed.       COMPARISON:   CT abdomen and pelvis March 13, 2019.       HISTORY:   ORDERING SYSTEM PROVIDED HISTORY: Abnormal CT scan, pelvis   TECHNOLOGIST PROVIDED HISTORY:   pain       FINDINGS:       Measurements:       Uterus:  Not measured       Endometrial stripe:  Not measured       Right Ovary:  2.7 x 2.2 x 1.5 cm       Left Ovary:  3.2 x 1.9 x 2.7 cm           Ultrasound Findings:       Uterus: surgically removed.       Right Ovary: Right ovary is within normal limits.  No Doppler evaluation was   performed.       Left Ovary:  Left ovary is within normal limits. There is normal arterial and   venous doppler flow.       Free Fluid: Small amount of complex free fluid seen within the pelvis.           Impression   *Small amount of complex free fluid seen within the pelvis.  Etiology is   unknown and could possibly represent hemoperitoneum.  Malignant ascites   cannot be excluded.  Correlation with H and H is recommended. *No adnexal mass. Findings were discussed with CARA PATTERSON at 1:13 pm on 3/15/2019. EXAMINATION:   CT OF THE ABDOMEN AND PELVIS WITH CONTRAST 3/13/2019 1:16 pm       TECHNIQUE:   CT of the abdomen and pelvis was performed with the administration of   intravenous contrast. Multiplanar reformatted images are provided for review.    Dose modulation, iterative reconstruction, and/or weight based adjustment of   the mA/kV was utilized to reduce the radiation dose to as low as reasonably   achievable.     evidence for acute intra-abdominal or intrapelvic pathology. No bowel obstruction or inflammation.  No free intraperitoneal air or fluid. Normal appendix.            Lab Results:  Results for orders placed or performed during the hospital encounter of 04/30/19   CBC Auto Differential   Result Value Ref Range    WBC 8.3 3.5 - 11.0 k/uL    RBC 4.53 4.0 - 5.2 m/uL    Hemoglobin 12.9 12.0 - 16.0 g/dL    Hematocrit 39.0 36 - 46 %    MCV 86.1 80 - 100 fL    MCH 28.4 26 - 34 pg    MCHC 33.0 31 - 37 g/dL    RDW 14.3 11.5 - 14.9 %    Platelets 160 356 - 452 k/uL    MPV 8.0 6.0 - 12.0 fL    NRBC Automated NOT REPORTED per 100 WBC    Differential Type NOT REPORTED     Seg Neutrophils 59 36 - 66 %    Lymphocytes 31 24 - 44 %    Monocytes 5 1 - 7 %    Eosinophils % 4 0 - 4 %    Basophils 1 0 - 2 %    Immature Granulocytes NOT REPORTED 0 %    Segs Absolute 4.90 1.3 - 9.1 k/uL    Absolute Lymph # 2.50 1.0 - 4.8 k/uL    Absolute Mono # 0.40 0.1 - 1.3 k/uL    Absolute Eos # 0.30 0.0 - 0.4 k/uL    Basophils # 0.10 0.0 - 0.2 k/uL    Absolute Immature Granulocyte NOT REPORTED 0.00 - 0.30 k/uL    WBC Morphology NOT REPORTED     RBC Morphology NOT REPORTED     Platelet Estimate NOT REPORTED    Comprehensive Metabolic Panel w/ Reflex to MG   Result Value Ref Range    Glucose 105 (H) 70 - 99 mg/dL    BUN 11 6 - 20 mg/dL    CREATININE 0.86 0.50 - 0.90 mg/dL    Bun/Cre Ratio NOT REPORTED 9 - 20    Calcium 8.9 8.6 - 10.4 mg/dL    Sodium 142 135 - 144 mmol/L    Potassium 4.0 3.7 - 5.3 mmol/L    Chloride 105 98 - 107 mmol/L    CO2 25 20 - 31 mmol/L    Anion Gap 12 9 - 17 mmol/L    Alkaline Phosphatase 103 35 - 104 U/L    ALT 12 5 - 33 U/L    AST 14 <32 U/L    Total Bilirubin 0.15 (L) 0.3 - 1.2 mg/dL    Total Protein 7.7 6.4 - 8.3 g/dL    Alb 4.2 3.5 - 5.2 g/dL    Albumin/Globulin Ratio NOT REPORTED 1.0 - 2.5    GFR Non-African American >60 >60 mL/min    GFR African American >60 >60 mL/min    GFR Comment          GFR Staging NOT REPORTED Lipase   Result Value Ref Range    Lipase 28 13 - 60 U/L   Urinalysis Reflex to Culture   Result Value Ref Range    Color, UA YELLOW YELLOW    Turbidity UA CLEAR CLEAR    Glucose, Ur NEGATIVE NEGATIVE    Bilirubin Urine NEGATIVE NEGATIVE    Ketones, Urine NEGATIVE NEGATIVE    Specific [place], UA 1.016 1.000 - 1.030    Urine Hgb NEGATIVE NEGATIVE    pH, UA 6.5 5.0 - 8.0    Protein, UA NEGATIVE NEGATIVE    Urobilinogen, Urine Normal Normal    Nitrite, Urine NEGATIVE NEGATIVE    Leukocyte Esterase, Urine NEGATIVE NEGATIVE    Urinalysis Comments       Microscopic exam not performed based on chemical results unless requested in original order. Assessment:   Diagnosis Orders   1. Cyst of right ovary     2. Preventative health care  CHA DIGITAL SCREEN W OR WO CAD BILATERAL     Chief Complaint   Patient presents with    Abdominal Pain         Patient Active Problem List    Diagnosis Date Noted    Cyst of right ovary 03/27/2019       PLAN:  Return in about 6 months (around 11/14/2019) for Annual.  Pt declined exam today  Sono in 6 months  Counseled on preventative health maintenance follow-up. Orders Placed This Encounter   Procedures    CHA DIGITAL SCREEN W OR WO CAD BILATERAL     Standing Status:   Future     Standing Expiration Date:   7/14/2020     Order Specific Question:   Reason for exam:     Answer:   screen       Patient was seen with total face to face time of 30 minutes. More than 50% of this visit was counseling and education regarding The primary encounter diagnosis was Cyst of right ovary. A diagnosis of Preventative health care was also pertinent to this visit. and Abdominal Pain   as well as  counseling on preventative health maintenance follow-up.

## 2019-07-02 ENCOUNTER — TELEPHONE (OUTPATIENT)
Dept: PRIMARY CARE CLINIC | Age: 54
End: 2019-07-02

## 2020-02-18 ENCOUNTER — TELEPHONE (OUTPATIENT)
Dept: PRIMARY CARE CLINIC | Age: 55
End: 2020-02-18

## 2020-09-26 ENCOUNTER — APPOINTMENT (OUTPATIENT)
Dept: GENERAL RADIOLOGY | Age: 55
End: 2020-09-26
Payer: OTHER GOVERNMENT

## 2020-09-26 ENCOUNTER — HOSPITAL ENCOUNTER (EMERGENCY)
Age: 55
Discharge: HOME | End: 2020-09-26
Attending: EMERGENCY MEDICINE
Payer: OTHER GOVERNMENT

## 2020-09-26 VITALS
RESPIRATION RATE: 16 BRPM | OXYGEN SATURATION: 100 % | SYSTOLIC BLOOD PRESSURE: 154 MMHG | HEART RATE: 79 BPM | HEIGHT: 66 IN | DIASTOLIC BLOOD PRESSURE: 87 MMHG | WEIGHT: 150 LBS | TEMPERATURE: 98.5 F | BODY MASS INDEX: 24.11 KG/M2

## 2020-09-26 LAB
ABSOLUTE EOS #: 0.1 K/UL (ref 0–0.4)
ABSOLUTE IMMATURE GRANULOCYTE: NORMAL K/UL (ref 0–0.3)
ABSOLUTE LYMPH #: 2.4 K/UL (ref 1–4.8)
ABSOLUTE MONO #: 0.3 K/UL (ref 0.1–1.3)
ANION GAP SERPL CALCULATED.3IONS-SCNC: 8 MMOL/L (ref 9–17)
BASOPHILS # BLD: 1 % (ref 0–2)
BASOPHILS ABSOLUTE: 0 K/UL (ref 0–0.2)
BUN BLDV-MCNC: 10 MG/DL (ref 6–20)
BUN/CREAT BLD: ABNORMAL (ref 9–20)
CALCIUM SERPL-MCNC: 9.4 MG/DL (ref 8.6–10.4)
CHLORIDE BLD-SCNC: 108 MMOL/L (ref 98–107)
CO2: 24 MMOL/L (ref 20–31)
CREAT SERPL-MCNC: 0.45 MG/DL (ref 0.5–0.9)
D-DIMER QUANTITATIVE: 0.57 MG/L FEU (ref 0–0.59)
DIFFERENTIAL TYPE: NORMAL
EOSINOPHILS RELATIVE PERCENT: 2 % (ref 0–4)
GFR AFRICAN AMERICAN: >60 ML/MIN
GFR NON-AFRICAN AMERICAN: >60 ML/MIN
GFR SERPL CREATININE-BSD FRML MDRD: ABNORMAL ML/MIN/{1.73_M2}
GFR SERPL CREATININE-BSD FRML MDRD: ABNORMAL ML/MIN/{1.73_M2}
GLUCOSE BLD-MCNC: 153 MG/DL (ref 70–99)
HCT VFR BLD CALC: 38.3 % (ref 36–46)
HEMOGLOBIN: 12.7 G/DL (ref 12–16)
IMMATURE GRANULOCYTES: NORMAL %
LYMPHOCYTES # BLD: 35 % (ref 24–44)
MCH RBC QN AUTO: 28.1 PG (ref 26–34)
MCHC RBC AUTO-ENTMCNC: 33.1 G/DL (ref 31–37)
MCV RBC AUTO: 84.8 FL (ref 80–100)
MONOCYTES # BLD: 5 % (ref 1–7)
NRBC AUTOMATED: NORMAL PER 100 WBC
PDW BLD-RTO: 13.2 % (ref 11.5–14.9)
PLATELET # BLD: 288 K/UL (ref 150–450)
PLATELET ESTIMATE: NORMAL
PMV BLD AUTO: 7.6 FL (ref 6–12)
POTASSIUM SERPL-SCNC: 3.7 MMOL/L (ref 3.7–5.3)
RBC # BLD: 4.52 M/UL (ref 4–5.2)
RBC # BLD: NORMAL 10*6/UL
SEG NEUTROPHILS: 57 % (ref 36–66)
SEGMENTED NEUTROPHILS ABSOLUTE COUNT: 3.9 K/UL (ref 1.3–9.1)
SODIUM BLD-SCNC: 140 MMOL/L (ref 135–144)
TROPONIN INTERP: NORMAL
TROPONIN T: NORMAL NG/ML
TROPONIN, HIGH SENSITIVITY: <6 NG/L (ref 0–14)
WBC # BLD: 6.8 K/UL (ref 3.5–11)
WBC # BLD: NORMAL 10*3/UL

## 2020-09-26 PROCEDURE — 36415 COLL VENOUS BLD VENIPUNCTURE: CPT

## 2020-09-26 PROCEDURE — 99284 EMERGENCY DEPT VISIT MOD MDM: CPT

## 2020-09-26 PROCEDURE — 99283 EMERGENCY DEPT VISIT LOW MDM: CPT

## 2020-09-26 PROCEDURE — 6360000002 HC RX W HCPCS: Performed by: EMERGENCY MEDICINE

## 2020-09-26 PROCEDURE — 85379 FIBRIN DEGRADATION QUANT: CPT

## 2020-09-26 PROCEDURE — 96374 THER/PROPH/DIAG INJ IV PUSH: CPT

## 2020-09-26 PROCEDURE — 71045 X-RAY EXAM CHEST 1 VIEW: CPT

## 2020-09-26 PROCEDURE — 80048 BASIC METABOLIC PNL TOTAL CA: CPT

## 2020-09-26 PROCEDURE — 84484 ASSAY OF TROPONIN QUANT: CPT

## 2020-09-26 PROCEDURE — 6370000000 HC RX 637 (ALT 250 FOR IP): Performed by: EMERGENCY MEDICINE

## 2020-09-26 PROCEDURE — 93005 ELECTROCARDIOGRAM TRACING: CPT | Performed by: EMERGENCY MEDICINE

## 2020-09-26 PROCEDURE — 85025 COMPLETE CBC W/AUTO DIFF WBC: CPT

## 2020-09-26 RX ORDER — KETOROLAC TROMETHAMINE 30 MG/ML
30 INJECTION, SOLUTION INTRAMUSCULAR; INTRAVENOUS ONCE
Status: COMPLETED | OUTPATIENT
Start: 2020-09-26 | End: 2020-09-26

## 2020-09-26 RX ORDER — NAPROXEN 500 MG/1
500 TABLET ORAL 2 TIMES DAILY PRN
Qty: 20 TABLET | Refills: 0 | Status: SHIPPED | OUTPATIENT
Start: 2020-09-26 | End: 2022-07-29

## 2020-09-26 RX ORDER — ASPIRIN 81 MG/1
324 TABLET, CHEWABLE ORAL ONCE
Status: COMPLETED | OUTPATIENT
Start: 2020-09-26 | End: 2020-09-26

## 2020-09-26 RX ADMIN — ASPIRIN 324 MG: 81 TABLET, CHEWABLE ORAL at 22:24

## 2020-09-26 RX ADMIN — KETOROLAC TROMETHAMINE 30 MG: 30 INJECTION, SOLUTION INTRAMUSCULAR; INTRAVENOUS at 22:25

## 2020-09-26 ASSESSMENT — PAIN DESCRIPTION - DESCRIPTORS: DESCRIPTORS: HEAVINESS

## 2020-09-26 ASSESSMENT — ENCOUNTER SYMPTOMS
COUGH: 0
ABDOMINAL PAIN: 0
SHORTNESS OF BREATH: 0
NAUSEA: 0

## 2020-09-26 ASSESSMENT — PAIN DESCRIPTION - LOCATION: LOCATION: CHEST

## 2020-09-26 ASSESSMENT — HEART SCORE: ECG: 0

## 2020-09-26 ASSESSMENT — PAIN SCALES - GENERAL
PAINLEVEL_OUTOF10: 9
PAINLEVEL_OUTOF10: 9

## 2020-09-26 ASSESSMENT — PAIN DESCRIPTION - ORIENTATION: ORIENTATION: UPPER;RIGHT

## 2020-09-27 ASSESSMENT — ENCOUNTER SYMPTOMS
COLOR CHANGE: 0
BACK PAIN: 0
EYE PAIN: 0

## 2020-09-27 NOTE — ED PROVIDER NOTES
EMERGENCY DEPARTMENT ENCOUNTER    Pt Name: Nahid Saenz  MRN: [id number]  [place] 1965  Date of evaluation: 9/26/20  CHIEF COMPLAINT       Chief Complaint   Patient presents with    Chest Pain     HISTORY OF PRESENT ILLNESS   57-year-old female presents with chief complaint of right-sided chest pain. Patient states she was at work on Thursday and was moving heavy boxes and started to have right-sided chest pain. Patient states she been having pain constantly since then. Patient states the pain is worse when she moves her arm or presses on her chest.  Patient denies any radiation of pain, states it feels like a aching and heaviness. Patient denies any past medical history, denies any family history of cardiac issues. The history is provided by the patient. Chest Pain   Pain location:  R chest  Pain quality: aching and pressure    Pain radiates to:  Does not radiate  Pain severity:  Mild  Duration:  3 days  Timing:  Constant  Progression:  Unchanged  Chronicity:  New  Context: movement and raising an arm    Relieved by:  Nothing  Worsened by: Movement  Ineffective treatments:  None tried  Associated symptoms: no abdominal pain, no back pain, no cough, no dizziness, no fatigue, no fever, no headache, no nausea, no near-syncope, no numbness, no palpitations and no shortness of breath            REVIEW OF SYSTEMS     Review of Systems   Constitutional: Negative for fatigue and fever. HENT: Negative for congestion and ear pain. Eyes: Negative for pain. Respiratory: Negative for cough and shortness of breath. Cardiovascular: Positive for chest pain. Negative for palpitations, leg swelling and near-syncope. Gastrointestinal: Negative for abdominal pain and nausea. Genitourinary: Negative for dysuria and flank pain. Musculoskeletal: Negative for back pain. Skin: Negative for color change. Neurological: Negative for dizziness, numbness and headaches.    Psychiatric/Behavioral: Negative for confusion. All other systems reviewed and are negative. PASTMEDICAL HISTORY     Past Medical History:   Diagnosis Date    Fibroid tumor      Past Problem List  Patient Active Problem List   Diagnosis Code    Cyst of right ovary N83.201     SURGICAL HISTORY       Past Surgical History:   Procedure Laterality Date    BREAST ENHANCEMENT SURGERY      HYSTERECTOMY      unsure of total    LASIK       CURRENT MEDICATIONS       Discharge Medication List as of 9/26/2020 10:54 PM      CONTINUE these medications which have NOT CHANGED    Details   celecoxib (CELEBREX) 200 MG capsule Take 1 capsule by mouth daily, Disp-60 capsule, R-0Normal      Doxylamine Succinate, Sleep, (UNISOM PO) Take by mouth as needed (sleep)Historical Med           ALLERGIES     has No Known Allergies. FAMILY HISTORY     She indicated that the status of her mother is unknown. She indicated that the status of her father is unknown. She indicated that the status of her sister is unknown. SOCIAL HISTORY       Social History     Tobacco Use    Smoking status: Never Smoker    Smokeless tobacco: Never Used   Substance Use Topics    Alcohol use: Not Currently    Drug use: Never     PHYSICAL EXAM     INITIAL VITALS: BP (!) 154/87   Pulse 79   Temp 98.5 °F (36.9 °C) (Oral)   Resp 16   Ht 5' 6\" (1.676 m)   Wt 150 lb (68 kg)   SpO2 100%   BMI 24.21 kg/m²    Physical Exam  Vitals signs and nursing note reviewed. Constitutional:       General: She is not in acute distress. Appearance: Normal appearance. She is obese. She is not toxic-appearing. HENT:      Head: Normocephalic and atraumatic. Nose: Nose normal.      Mouth/Throat:      Mouth: Mucous membranes are moist.      Pharynx: Oropharynx is clear. Eyes:      Extraocular Movements: Extraocular movements intact. Conjunctiva/sclera: Conjunctivae normal.   Neck:      Musculoskeletal: Normal range of motion.    Cardiovascular:      Rate and Rhythm: Normal rate and regular rhythm. Pulses: Normal pulses. Heart sounds: Normal heart sounds. Pulmonary:      Effort: Pulmonary effort is normal.      Breath sounds: Normal breath sounds. Chest:       Abdominal:      General: Bowel sounds are normal. There is no distension. Palpations: Abdomen is soft. Tenderness: There is no abdominal tenderness. Musculoskeletal: Normal range of motion. Skin:     General: Skin is warm and dry. Capillary Refill: Capillary refill takes less than 2 seconds. Neurological:      General: No focal deficit present. Mental Status: She is alert. Psychiatric:         Mood and Affect: Mood normal.         MEDICAL DECISION MAKIN-year-old female presents with complaint of right-sided chest pain. On initial exam patient is in no acute distress, vital signs are stable. Will obtain lab work provide aspirin. Labs reviewed and unremarkable, chest x-ray reviewed and no acute process was noted, patient was provided a dose of Toradol and reexamined. Patient states that the pain was improved after pain medication. Discussed the results with the patient, patient states she has been better and is comfortable with discharge home at this time. Patient states she will follow-up with her PCP. Patient was given return precautions including worsening of pain, some shortness of breath, fevers or other concerning symptoms. Patient voiced understanding and stated she will return to ED if needed. Patient/Guardian was informed of their diagnosis and told to follow up with PCP in 1-3 days. Patient demonstrates understanding and agreement with the plan. They were given the opportunity to ask questions and those questions were answered to the best of our ability with the available information. Patient/Guardian told to return to the ED for any new, worsening, changing or persistent symptoms. This dictation was prepared using [employer] voice recognition software.  As a result, errors may have occurred. When identified, these errors have been corrected. While every attempt is made to correct errors in dictation, errors may still exist.          CRITICAL CARE:       PROCEDURES:    Procedures    DIAGNOSTIC RESULTS   EKG:All EKG's are interpreted by the Emergency Department Physician who either signs or Co-signs this chart in the absence of a cardiologist.  EKG reviewed showing sinus rhythm with occasional PVC, rate of 79, normal axis, normal intervals, no significant ST segment elevation or depression, nonspecific T wave changes      RADIOLOGY:All plain film, CT, MRI, and formal ultrasound images (except ED bedside ultrasound) are read by the radiologist, see reports below, unless otherwisenoted in MDM or here. XR CHEST PORTABLE   Final Result   No acute cardiopulmonary abnormality. LABS: All lab results were reviewed by myself, and all abnormals are listed below. Labs Reviewed   BASIC METABOLIC PANEL W/ REFLEX TO MG FOR LOW K - Abnormal; Notable for the following components:       Result Value    Glucose 153 (*)     CREATININE 0.45 (*)     Chloride 108 (*)     Anion Gap 8 (*)     All other components within normal limits   CBC WITH AUTO DIFFERENTIAL   TROPONIN   D-DIMER, QUANTITATIVE       EMERGENCY DEPARTMENTCOURSE:         Vitals:    Vitals:    09/26/20 2129   BP: (!) 154/87   Pulse: 79   Resp: 16   Temp: 98.5 °F (36.9 °C)   TempSrc: Oral   SpO2: 100%   Weight: 150 lb (68 kg)   Height: 5' 6\" (1.676 m)       The patient was given the following medications while in the emergency department:  Orders Placed This Encounter   Medications    aspirin chewable tablet 324 mg    ketorolac (TORADOL) injection 30 mg    naproxen (NAPROSYN) 500 MG tablet     Sig: Take 1 tablet by mouth 2 times daily as needed for Pain     Dispense:  20 tablet     Refill:  0     CONSULTS:  None    FINAL IMPRESSION      1.  Acute chest pain          DISPOSITION/PLAN   DISPOSITION        PATIENT REFERRED TO:  Stefani Capps PA-C  [address]  [phone]    Schedule an appointment as soon as possible for a visit       [address] [facility] [address]  [address] [facility]  [phone]    As needed, If symptoms worsen    DISCHARGE MEDICATIONS:  Discharge Medication List as of 9/26/2020 10:54 PM      START taking these medications    Details   naproxen (NAPROSYN) 500 MG tablet Take 1 tablet by mouth 2 times daily as needed for Pain, Disp-20 tablet,R-0Print           Silvia Camara DO  Attending Emergency Physician                  Silvia Camara DO  09/27/20 0015

## 2020-09-29 LAB
EKG ATRIAL RATE: 79 BPM
EKG P AXIS: 29 DEGREES
EKG P-R INTERVAL: 130 MS
EKG Q-T INTERVAL: 398 MS
EKG QRS DURATION: 70 MS
EKG QTC CALCULATION (BAZETT): 456 MS
EKG R AXIS: 34 DEGREES
EKG T AXIS: 5 DEGREES
EKG VENTRICULAR RATE: 79 BPM

## 2020-09-29 PROCEDURE — 93010 ELECTROCARDIOGRAM REPORT: CPT | Performed by: INTERNAL MEDICINE

## 2021-08-28 ENCOUNTER — APPOINTMENT (OUTPATIENT)
Dept: GENERAL RADIOLOGY | Age: 56
End: 2021-08-28
Payer: OTHER GOVERNMENT

## 2021-08-28 ENCOUNTER — HOSPITAL ENCOUNTER (EMERGENCY)
Age: 56
Discharge: HOME | End: 2021-08-28
Attending: EMERGENCY MEDICINE
Payer: OTHER GOVERNMENT

## 2021-08-28 VITALS
TEMPERATURE: 98.1 F | HEART RATE: 94 BPM | BODY MASS INDEX: 26.49 KG/M2 | WEIGHT: 159 LBS | DIASTOLIC BLOOD PRESSURE: 76 MMHG | SYSTOLIC BLOOD PRESSURE: 144 MMHG | HEIGHT: 65 IN | OXYGEN SATURATION: 99 % | RESPIRATION RATE: 29 BRPM

## 2021-08-28 DIAGNOSIS — R05.9 COUGH: Primary | ICD-10-CM

## 2021-08-28 LAB
ABSOLUTE EOS #: 0.2 K/UL (ref 0–0.4)
ABSOLUTE IMMATURE GRANULOCYTE: ABNORMAL K/UL (ref 0–0.3)
ABSOLUTE LYMPH #: 2.5 K/UL (ref 1–4.8)
ABSOLUTE MONO #: 0.6 K/UL (ref 0.1–1.3)
ALBUMIN SERPL-MCNC: 3.9 G/DL (ref 3.5–5.2)
ALBUMIN/GLOBULIN RATIO: ABNORMAL (ref 1–2.5)
ALP BLD-CCNC: 171 U/L (ref 35–104)
ALT SERPL-CCNC: 36 U/L (ref 5–33)
ANION GAP SERPL CALCULATED.3IONS-SCNC: 11 MMOL/L (ref 9–17)
AST SERPL-CCNC: 21 U/L
BASOPHILS # BLD: 0 % (ref 0–2)
BASOPHILS ABSOLUTE: 0 K/UL (ref 0–0.2)
BILIRUB SERPL-MCNC: 0.26 MG/DL (ref 0.3–1.2)
BUN BLDV-MCNC: 7 MG/DL (ref 6–20)
BUN/CREAT BLD: ABNORMAL (ref 9–20)
CALCIUM SERPL-MCNC: 9 MG/DL (ref 8.6–10.4)
CHLORIDE BLD-SCNC: 108 MMOL/L (ref 98–107)
CO2: 24 MMOL/L (ref 20–31)
CREAT SERPL-MCNC: <0.4 MG/DL (ref 0.5–0.9)
DIFFERENTIAL TYPE: ABNORMAL
EOSINOPHILS RELATIVE PERCENT: 3 % (ref 0–4)
GFR AFRICAN AMERICAN: ABNORMAL ML/MIN
GFR NON-AFRICAN AMERICAN: ABNORMAL ML/MIN
GFR SERPL CREATININE-BSD FRML MDRD: ABNORMAL ML/MIN/{1.73_M2}
GFR SERPL CREATININE-BSD FRML MDRD: ABNORMAL ML/MIN/{1.73_M2}
GLUCOSE BLD-MCNC: 149 MG/DL (ref 70–99)
HCT VFR BLD CALC: 35 % (ref 36–46)
HEMOGLOBIN: 11.2 G/DL (ref 12–16)
IMMATURE GRANULOCYTES: ABNORMAL %
LYMPHOCYTES # BLD: 31 % (ref 24–44)
MCH RBC QN AUTO: 26.3 PG (ref 26–34)
MCHC RBC AUTO-ENTMCNC: 32.1 G/DL (ref 31–37)
MCV RBC AUTO: 81.9 FL (ref 80–100)
MONOCYTES # BLD: 8 % (ref 1–7)
NRBC AUTOMATED: ABNORMAL PER 100 WBC
PDW BLD-RTO: 14.1 % (ref 11.5–14.9)
PLATELET # BLD: 266 K/UL (ref 150–450)
PLATELET ESTIMATE: ABNORMAL
PMV BLD AUTO: 8.1 FL (ref 6–12)
POTASSIUM SERPL-SCNC: 3.2 MMOL/L (ref 3.7–5.3)
RBC # BLD: 4.27 M/UL (ref 4–5.2)
RBC # BLD: ABNORMAL 10*6/UL
SARS-COV-2, RAPID: NOT DETECTED
SEG NEUTROPHILS: 58 % (ref 36–66)
SEGMENTED NEUTROPHILS ABSOLUTE COUNT: 4.6 K/UL (ref 1.3–9.1)
SODIUM BLD-SCNC: 143 MMOL/L (ref 135–144)
SPECIMEN DESCRIPTION: NORMAL
TOTAL PROTEIN: 6.8 G/DL (ref 6.4–8.3)
WBC # BLD: 7.9 K/UL (ref 3.5–11)
WBC # BLD: ABNORMAL 10*3/UL

## 2021-08-28 PROCEDURE — 6370000000 HC RX 637 (ALT 250 FOR IP): Performed by: EMERGENCY MEDICINE

## 2021-08-28 PROCEDURE — 36415 COLL VENOUS BLD VENIPUNCTURE: CPT

## 2021-08-28 PROCEDURE — 85025 COMPLETE CBC W/AUTO DIFF WBC: CPT

## 2021-08-28 PROCEDURE — 71045 X-RAY EXAM CHEST 1 VIEW: CPT

## 2021-08-28 PROCEDURE — 2580000003 HC RX 258: Performed by: EMERGENCY MEDICINE

## 2021-08-28 PROCEDURE — 87635 SARS-COV-2 COVID-19 AMP PRB: CPT

## 2021-08-28 PROCEDURE — 80053 COMPREHEN METABOLIC PANEL: CPT

## 2021-08-28 PROCEDURE — 99283 EMERGENCY DEPT VISIT LOW MDM: CPT

## 2021-08-28 RX ORDER — 0.9 % SODIUM CHLORIDE 0.9 %
1000 INTRAVENOUS SOLUTION INTRAVENOUS ONCE
Status: COMPLETED | OUTPATIENT
Start: 2021-08-28 | End: 2021-08-28

## 2021-08-28 RX ORDER — POTASSIUM CHLORIDE 20 MEQ/1
40 TABLET, EXTENDED RELEASE ORAL ONCE
Status: COMPLETED | OUTPATIENT
Start: 2021-08-28 | End: 2021-08-28

## 2021-08-28 RX ADMIN — POTASSIUM CHLORIDE 40 MEQ: 1500 TABLET, EXTENDED RELEASE ORAL at 16:43

## 2021-08-28 RX ADMIN — SODIUM CHLORIDE 1000 ML: 9 INJECTION, SOLUTION INTRAVENOUS at 15:41

## 2021-08-28 ASSESSMENT — ENCOUNTER SYMPTOMS: COUGH: 1

## 2021-08-28 ASSESSMENT — PAIN DESCRIPTION - ORIENTATION: ORIENTATION: MID

## 2021-08-28 ASSESSMENT — PAIN DESCRIPTION - LOCATION: LOCATION: CHEST;MEDIASTINUM

## 2021-08-28 ASSESSMENT — PAIN DESCRIPTION - PROGRESSION: CLINICAL_PROGRESSION: NOT CHANGED

## 2021-08-28 ASSESSMENT — PAIN DESCRIPTION - FREQUENCY: FREQUENCY: CONTINUOUS

## 2021-08-28 ASSESSMENT — PAIN DESCRIPTION - ONSET: ONSET: SUDDEN

## 2021-08-28 ASSESSMENT — PAIN SCALES - GENERAL: PAINLEVEL_OUTOF10: 6

## 2021-08-28 ASSESSMENT — PAIN DESCRIPTION - PAIN TYPE: TYPE: ACUTE PAIN

## 2021-08-28 ASSESSMENT — PAIN DESCRIPTION - DESCRIPTORS: DESCRIPTORS: ACHING

## 2021-08-28 NOTE — ED PROVIDER NOTES
EMERGENCY DEPARTMENT ENCOUNTER    Pt Name: Majo Funez  MRN: [id number]  [place] 1965  Date of evaluation: 8/28/21  CHIEF COMPLAINT       Chief Complaint   Patient presents with    Cough    Chest Pain    Shortness of Breath    Diarrhea     HISTORY OF PRESENT ILLNESS     Cough  Cough characteristics:  Non-productive  Severity:  Moderate  Onset quality:  Gradual  Duration:  2 weeks  Timing:  Constant  Progression:  Unchanged  Chronicity:  New  Smoker: no    Context comment:  Symptoms started after getting second covid vaccine 2 weeks ago, she works at [employer] by:  Nothing  Worsened by:  Nothing  Ineffective treatments: 12 hour cough medicine. Associated symptoms comment:  Had some loose stools  covid contacts at work, [employer]    No cp now  No diaphoresis  She is worried she has covid  People at her work get it all the time        REVIEW OF SYSTEMS     Review of Systems   Respiratory: Positive for cough. All other systems reviewed and are negative. PASTMEDICAL HISTORY     Past Medical History:   Diagnosis Date    Fibroid tumor      Past Problem List  Patient Active Problem List   Diagnosis Code    Cyst of right ovary N83.201     SURGICAL HISTORY       Past Surgical History:   Procedure Laterality Date    BREAST ENHANCEMENT SURGERY      HYSTERECTOMY      unsure of total    LASIK       CURRENT MEDICATIONS       Previous Medications    CELECOXIB (CELEBREX) 200 MG CAPSULE    Take 1 capsule by mouth daily    DOXYLAMINE SUCCINATE, SLEEP, (UNISOM PO)    Take by mouth as needed (sleep)    NAPROXEN (NAPROSYN) 500 MG TABLET    Take 1 tablet by mouth 2 times daily as needed for Pain     ALLERGIES     has No Known Allergies. FAMILY HISTORY     She indicated that the status of her mother is unknown. She indicated that the status of her father is unknown. She indicated that the status of her sister is unknown.      SOCIAL HISTORY       Social History     Tobacco Use    Smoking status: Never Smoker    Smokeless tobacco: Never Used   Vaping Use    Vaping Use: Never used   Substance Use Topics    Alcohol use: Not Currently    Drug use: Never     PHYSICAL EXAM     INITIAL VITALS: BP (!) 144/76   Pulse 94   Temp 98.1 °F (36.7 °C)   Resp 29   Ht 5' 5\" (1.651 m)   Wt 159 lb (72.1 kg)   SpO2 99%   BMI 26.46 kg/m²    Physical Exam  Constitutional:       General: She is not in acute distress. Appearance: Normal appearance. She is well-developed. She is not diaphoretic. HENT:      Head: Normocephalic and atraumatic. Right Ear: External ear normal.      Left Ear: External ear normal.      Nose: Nose normal. No congestion. Mouth/Throat:      Mouth: Mucous membranes are moist.      Pharynx: Oropharynx is clear. Eyes:      General:         Right eye: No discharge. Left eye: No discharge. Conjunctiva/sclera: Conjunctivae normal.      Pupils: Pupils are equal, round, and reactive to light. Neck:      Trachea: No tracheal deviation. Cardiovascular:      Rate and Rhythm: Normal rate and regular rhythm. Pulses: Normal pulses. Heart sounds: Normal heart sounds. Pulmonary:      Effort: Pulmonary effort is normal. No respiratory distress. Breath sounds: Normal breath sounds. No stridor. No wheezing or rales. Abdominal:      Palpations: Abdomen is soft. Tenderness: There is no abdominal tenderness. There is no guarding or rebound. Musculoskeletal:         General: No tenderness or deformity. Normal range of motion. Cervical back: Normal range of motion and neck supple. Skin:     General: Skin is warm and dry. Capillary Refill: Capillary refill takes less than 2 seconds. Findings: No erythema or rash. Neurological:      General: No focal deficit present. Mental Status: She is alert and oriented to person, place, and time. Cranial Nerves: No cranial nerve deficit.       Coordination: Coordination normal.   Psychiatric:         Mood and Affect: Mood normal.         Behavior: Behavior normal.         Thought Content: Thought content normal.         Judgment: Judgment normal.         MEDICAL DECISION MAKING:   Do not suspect PE  Pulse < 100 bpm  SaO2 > 94%  No unilateral leg swelling  No hemoptysis  No recent trauma or surgery  No prior PE or DVT  No hormone use    covid neg  cxr NAP  Reviewed labs  Discussed with patient anticipatory guidance, discharge instructions, follow up PCP 24 hours             Procedures    DIAGNOSTIC RESULTS     RADIOLOGY:All plain film, CT, MRI, and formal ultrasound images (except ED bedside ultrasound) are read by the radiologist, see reports below, unless otherwisenoted in MDM or here. XR CHEST PORTABLE   Final Result   Unchanged appearance of the chest without acute airspace disease identified. LABS: All lab results were reviewed by myself, and all abnormals are listed below.   Labs Reviewed   CBC WITH AUTO DIFFERENTIAL - Abnormal; Notable for the following components:       Result Value    Hemoglobin 11.2 (*)     Hematocrit 35.0 (*)     Monocytes 8 (*)     All other components within normal limits   COMPREHENSIVE METABOLIC PANEL - Abnormal; Notable for the following components:    Glucose 149 (*)     CREATININE <0.40 (*)     Potassium 3.2 (*)     Chloride 108 (*)     Alkaline Phosphatase 171 (*)     ALT 36 (*)     Total Bilirubin 0.26 (*)     All other components within normal limits   COVID-19, RAPID       EMERGENCY DEPARTMENTCOURSE:         Vitals:    Vitals:    08/28/21 1514 08/28/21 1530 08/28/21 1600   BP: (!) 147/87 (!) 160/81 (!) 144/76   Pulse: 93 91 94   Resp: 18 25 29   Temp: 98.1 °F (36.7 °C)     SpO2: 100% 97% 99%   Weight: 159 lb (72.1 kg)     Height: 5' 5\" (1.651 m)         The patient was given the following medications while in the emergency department:  Orders Placed This Encounter   Medications    0.9 % sodium chloride bolus    potassium chloride (KLOR-CON M) extended release tablet 40 mEq       FINAL IMPRESSION      1.  Cough          DISPOSITION/PLAN   DISPOSITION Decision To Discharge 08/28/2021 04:21:57 PM      PATIENT REFERRED TO:  Laura Lopez PA-C  [address]   [phone]    Schedule an appointment as soon as possible for a visit in 1 day      DISCHARGE MEDICATIONS:  New Prescriptions    No medications on file     Annabelle Segovia MD  Attending Emergency Physician                    Annabelle Segovia MD  08/28/21 2661

## 2021-08-28 NOTE — ED TRIAGE NOTES
Pt states she had her second dose of pfizer vaccine on 8/6/21 and then 5 days later started having diarrhea, chest tightness, headache, and cough. Patient reports she works at [employer] and they have been reporting employees testing positive everyday but she is unsure of any direct contact with positive case.

## 2021-08-28 NOTE — ED NOTES
Mode of arrival (squad #, walk in, police, etc) : walk in         Chief complaint(s): CP,SOB, incontinence of stool        Arrival Note (brief scenario, treatment PTA, etc). : patient arrived to ED from home with C/O CP, SOB, and incontinence of stool. Patient states approx 2 weeks ago she had her second pfizer covid vaccine dose, and then the CP, SOB, and incontinence of stool symptoms began. Patient states her chest feels tight, midline non radiating, back pain in between her shoulder blades, and increased SOB with exertion. Patient reports that multiple employees have been testing positive at her place of work, unsure of any direct contact, however states it is a possibility. Patient denies any abd pain, N/V, fevers or chills at this time. C= \"Have you ever felt that you should Cut down on your drinking? \"  No  A= \"Have people Annoyed you by criticizing your drinking? \"  No  G= \"Have you ever felt bad or Guilty about your drinking? \"  No  E= \"Have you ever had a drink as an Eye-opener first thing in the morning to steady your nerves or to help a hangover? \"  No      Deferred []      Reason for deferring: N/A    *If yes to two or more: probable alcohol abuse. Anne Marie De Leon RN  08/28/21 2935

## 2022-07-25 ENCOUNTER — HOSPITAL ENCOUNTER (EMERGENCY)
Age: 57
Discharge: HOME | End: 2022-07-25
Attending: STUDENT IN AN ORGANIZED HEALTH CARE EDUCATION/TRAINING PROGRAM
Payer: OTHER GOVERNMENT

## 2022-07-25 VITALS
RESPIRATION RATE: 20 BRPM | TEMPERATURE: 97.7 F | SYSTOLIC BLOOD PRESSURE: 145 MMHG | HEART RATE: 80 BPM | DIASTOLIC BLOOD PRESSURE: 89 MMHG | OXYGEN SATURATION: 100 %

## 2022-07-25 DIAGNOSIS — E07.9 THYROID DISORDER: ICD-10-CM

## 2022-07-25 DIAGNOSIS — R55 NEAR SYNCOPE: Primary | ICD-10-CM

## 2022-07-25 LAB
ABSOLUTE EOS #: 0.2 K/UL (ref 0–0.4)
ABSOLUTE LYMPH #: 2.2 K/UL (ref 1–4.8)
ABSOLUTE MONO #: 0.5 K/UL (ref 0.1–1.3)
ALBUMIN SERPL-MCNC: 4 G/DL (ref 3.5–5.2)
ALP BLD-CCNC: 190 U/L (ref 35–104)
ALT SERPL-CCNC: 23 U/L (ref 5–33)
ANION GAP SERPL CALCULATED.3IONS-SCNC: 9 MMOL/L (ref 9–17)
AST SERPL-CCNC: 20 U/L
BASOPHILS # BLD: 1 % (ref 0–2)
BASOPHILS ABSOLUTE: 0 K/UL (ref 0–0.2)
BILIRUB SERPL-MCNC: 0.33 MG/DL (ref 0.3–1.2)
BILIRUBIN URINE: NEGATIVE
BUN BLDV-MCNC: 12 MG/DL (ref 6–20)
CALCIUM SERPL-MCNC: 9.1 MG/DL (ref 8.6–10.4)
CHLORIDE BLD-SCNC: 106 MMOL/L (ref 98–107)
CO2: 24 MMOL/L (ref 20–31)
COLOR: YELLOW
COMMENT UA: NORMAL
CREAT SERPL-MCNC: <0.4 MG/DL (ref 0.5–0.9)
EOSINOPHILS RELATIVE PERCENT: 3 % (ref 0–4)
GFR AFRICAN AMERICAN: ABNORMAL ML/MIN
GFR NON-AFRICAN AMERICAN: ABNORMAL ML/MIN
GFR SERPL CREATININE-BSD FRML MDRD: ABNORMAL ML/MIN/{1.73_M2}
GLUCOSE BLD-MCNC: 111 MG/DL (ref 70–99)
GLUCOSE URINE: NEGATIVE
HCT VFR BLD CALC: 35.9 % (ref 36–46)
HEMOGLOBIN: 12.2 G/DL (ref 12–16)
KETONES, URINE: NEGATIVE
LEUKOCYTE ESTERASE, URINE: NEGATIVE
LYMPHOCYTES # BLD: 36 % (ref 24–44)
MCH RBC QN AUTO: 27.6 PG (ref 26–34)
MCHC RBC AUTO-ENTMCNC: 34.1 G/DL (ref 31–37)
MCV RBC AUTO: 81.1 FL (ref 80–100)
MONOCYTES # BLD: 8 % (ref 1–7)
NITRITE, URINE: NEGATIVE
PDW BLD-RTO: 13.5 % (ref 11.5–14.9)
PH UA: 7 (ref 5–8)
PLATELET # BLD: 227 K/UL (ref 150–450)
PMV BLD AUTO: 8.6 FL (ref 6–12)
POTASSIUM SERPL-SCNC: 3.9 MMOL/L (ref 3.7–5.3)
PROTEIN UA: NEGATIVE
RBC # BLD: 4.42 M/UL (ref 4–5.2)
SEG NEUTROPHILS: 52 % (ref 36–66)
SEGMENTED NEUTROPHILS ABSOLUTE COUNT: 3.3 K/UL (ref 1.3–9.1)
SODIUM BLD-SCNC: 139 MMOL/L (ref 135–144)
SPECIFIC GRAVITY UA: 1.01 (ref 1–1.03)
THYROXINE, FREE: 3.37 NG/DL (ref 0.93–1.7)
TOTAL PROTEIN: 6.9 G/DL (ref 6.4–8.3)
TROPONIN, HIGH SENSITIVITY: 6 NG/L (ref 0–14)
TSH SERPL DL<=0.05 MIU/L-ACNC: <0.01 UIU/ML (ref 0.3–5)
TURBIDITY: CLEAR
URINE HGB: NEGATIVE
UROBILINOGEN, URINE: NORMAL
WBC # BLD: 6.1 K/UL (ref 3.5–11)

## 2022-07-25 PROCEDURE — 36415 COLL VENOUS BLD VENIPUNCTURE: CPT

## 2022-07-25 PROCEDURE — 96361 HYDRATE IV INFUSION ADD-ON: CPT

## 2022-07-25 PROCEDURE — 80053 COMPREHEN METABOLIC PANEL: CPT

## 2022-07-25 PROCEDURE — 96360 HYDRATION IV INFUSION INIT: CPT

## 2022-07-25 PROCEDURE — 2580000003 HC RX 258: Performed by: STUDENT IN AN ORGANIZED HEALTH CARE EDUCATION/TRAINING PROGRAM

## 2022-07-25 PROCEDURE — 84443 ASSAY THYROID STIM HORMONE: CPT

## 2022-07-25 PROCEDURE — 81003 URINALYSIS AUTO W/O SCOPE: CPT

## 2022-07-25 PROCEDURE — 84439 ASSAY OF FREE THYROXINE: CPT

## 2022-07-25 PROCEDURE — 84484 ASSAY OF TROPONIN QUANT: CPT

## 2022-07-25 PROCEDURE — 99284 EMERGENCY DEPT VISIT MOD MDM: CPT

## 2022-07-25 PROCEDURE — 93005 ELECTROCARDIOGRAM TRACING: CPT | Performed by: STUDENT IN AN ORGANIZED HEALTH CARE EDUCATION/TRAINING PROGRAM

## 2022-07-25 PROCEDURE — 85025 COMPLETE CBC W/AUTO DIFF WBC: CPT

## 2022-07-25 RX ORDER — 0.9 % SODIUM CHLORIDE 0.9 %
1000 INTRAVENOUS SOLUTION INTRAVENOUS ONCE
Status: COMPLETED | OUTPATIENT
Start: 2022-07-25 | End: 2022-07-25

## 2022-07-25 RX ADMIN — SODIUM CHLORIDE 1000 ML: 9 INJECTION, SOLUTION INTRAVENOUS at 20:53

## 2022-07-26 LAB
EKG ATRIAL RATE: 84 BPM
EKG P AXIS: 56 DEGREES
EKG P-R INTERVAL: 142 MS
EKG Q-T INTERVAL: 410 MS
EKG QRS DURATION: 70 MS
EKG QTC CALCULATION (BAZETT): 484 MS
EKG R AXIS: 44 DEGREES
EKG T AXIS: 55 DEGREES
EKG VENTRICULAR RATE: 84 BPM

## 2022-07-26 PROCEDURE — 93010 ELECTROCARDIOGRAM REPORT: CPT | Performed by: INTERNAL MEDICINE

## 2022-07-26 ASSESSMENT — ENCOUNTER SYMPTOMS
VOMITING: 0
SORE THROAT: 0
RHINORRHEA: 0
ABDOMINAL PAIN: 0
SHORTNESS OF BREATH: 0
COUGH: 0
NAUSEA: 0
DIARRHEA: 1

## 2022-07-26 NOTE — ED PROVIDER NOTES
[address]     Pt Name: Qian Moya  MRN: [id number]  [place] 1965  Date of evaluation: 7/25/22       Qian Moya is a 49 y.o. female who presents with Fatigue (Weakness, near syncopal episode)      MDM:   Near syncopal episode just prior to arrival  No complaints otherwise  No fall or trauma, was assisted to ground    After IV fluid resuscitation and labs, patient states he feels significantly better. Was able to ambulate without any difficulty or lightheadedness or dizziness. Note did have a low TSH with somewhat elevated free T4. Afterwards patient then began complaining of some intermittent jitteriness, weight loss, intolerance to heat. Concern for hyperthyroid state. Recommended patient to follow-up with her primary care provider for further evaluation. Vitals:   Vitals:    07/25/22 2037   BP: (!) 145/89   Pulse: 80   Resp: 20   Temp: 97.7 °F (36.5 °C)   SpO2: 100%       PHYSICAL:   Temp: 97.7 °F (36.5 °C),  Heart Rate: 80, Resp: 20, BP: (!) 145/89, SpO2: 100 %  Gen: Non-toxic, Afebrile  Neck: Supple  Eyes: Mild exophthalmos bilaterally  Cards: Regular rate and rhythm  Pulm: Lung sounds clear to auscultation  Abdomen: Soft, non-tender, non-distended  Skin: warm, dry  Extremities: pulses 2+ radial / dorsalis pedis, no clubbing, cyanosis, edema  Neurologic: CAOX3, no facial asymmetry, no dysarthria or aphasia     Procedure:  IVC ULTRASOUND:    A limited bedside ultrasound of the Inferior Vena Cava was performed. The medical necessity was to evaluate the IVC for collapsibility to measure fluid volume status. The structure studied was the IVC. The approach was a subxiphoid to right upper quadrant view to visualize the IVC in the abdominal cavity. FINDINGS:  The IVC was identified and shows collapsibility at approximately 20%  The study was technically adequate.       IMAGES:  Electronically uploaded to the PACS system      EKG    EKG Interpretation    Interpreted by me    Rhythm: normal sinus   Rate: normal  Axis: normal  Ectopy: none  Conduction: normal  ST Segments: no acute change  T Waves: no acute change  Q Waves: none    Clinical Impression: Sinus rhythm rate of 82. No ST segment changes, no arrhythmia, no ectopy. Normal axis, good R wave progression. Nonsignificant Q waves in lead III    All EKG's are interpreted by the Emergency Department Physician whoeither signs or Co-signs this chart in the absence of a cardiologist.    I personally saw and examined the patient. I have reviewed and agree with the resident's findings, including all diagnostic interpretations and treatment plan as written. I was present for the key portions of any procedures performed and the inclusive time noted for any critical care statement. There was a high probability of clinically significant/life threatening deterioration in this patient's condition which required my urgent intervention. Total critical care time was 10 minutes. This excludes any time for separately reportable procedures. ED Course as of 07/26/22 0058 Mon Jul 25, 2022 2121 TSH(!): <0.01 [JG]   2123 EKG Interpretation    Interpreted by emergency department physician    Rhythm: normal sinus   Rate: normal  Axis: normal  Ectopy: none  Conduction: normal  ST Segments: normal  T Waves: normal  Q Waves: none    Clinical Impression: non-specific EKG    Claire Vora MD   [JG]   2142 Thyroxine, Free(!): 3.37 [JG]      ED Course User Index  [JG] Claire Vora MD       The care is provided during an unprecedented national emergency due to the novel coronavirus, COVID 19.   Ministerio Green DO  Attending Emergency Physician         Ministerio Green DO  07/26/22 5689

## 2022-07-26 NOTE — DISCHARGE INSTRUCTIONS
You will need to have your thyroid checked by your primary care provider in the next few days. Please call your primary care provider to schedule an appointment in 1-2 days for evaluation of your thyroid. Take your medication as indicated and prescribed. Get up slowly; dangle your feet over the bed before standing up, do not stand up quickly. PLEASE RETURN TO THE EMERGENCY DEPARTMENT IMMEDIATELY for worsening symptoms of increasing pain, shortness of breath, feeling of your heart fluttering or racing, swelling to your feet, unable to lay flat, sensation of the room spinning, slurring of speech, loss of strength, or if you develop any concerning symptoms such as: high fever not relieved by acetaminophen (Tylenol) and/or ibuprofen (Motrin / Advil), chills, persistent nausea and/or vomiting, vomiting up blood, blood in your stool, loss of consciousness, numbness, weakness or tingling in the arms or legs or change in color of the extremities, changes in mental status, persistent headache, blurry vision, loss of bladder / bowel control, unable to follow up with your physician, or other any other care or concern.

## 2022-07-26 NOTE — ED NOTES
Mode of arrival (squad #, walk in, police, etc) : EMS        Chief complaint(s): Near syncopal episode, weakness        Arrival Note (brief scenario, treatment PTA, etc). : Pt states while she was at work she almost had a syncopal episode. She states she has been feeling sick since yesterday. She initially did not want to come to the ER and then she almost passed out a second time while EMS was evaluating the pt then she decided to come in. Pt is alert and oriented x4.         C= \"Have you ever felt that you should Cut down on your drinking? \"  No  A= \"Have people Annoyed you by criticizing your drinking? \"  No  G= \"Have you ever felt bad or Guilty about your drinking? \"  No  E= \"Have you ever had a drink as an Eye-opener first thing in the morning to steady your nerves or to help a hangover? \"  No      Deferred []      Reason for deferring: N/A    *If yes to two or more: probable alcohol abuse. Gareth Drivers SHAMAR Dooley  07/25/22 2040

## 2022-07-26 NOTE — ED PROVIDER NOTES
[address]  Emergency Department Encounter  EmergencyMedicine Resident     Pt Name:Liliana Cuellar  MRN: [id number]  [place] 1965  Date of evaluation: 7/25/22  PCP:  Ml Rodríguez PA-C    This patient was evaluated in the Emergency Department for symptoms described in the history of present illness. The patient was evaluated in the context of the global COVID-19 pandemic, which necessitated consideration that the patient might be at risk for infection with the SARS-CoV-2 virus that causes COVID-19. Institutional protocols and algorithms that pertain to the evaluation of patients at risk for COVID-19 are in a state of rapid change based on information released by regulatory bodies including the CDC and federal and state organizations. These policies and algorithms were followed during the patient's care in the ED. CHIEF COMPLAINT       Chief Complaint   Patient presents with    Fatigue     Weakness, near syncopal episode       HISTORY OF PRESENT ILLNESS  (Location/Symptom, Timing/Onset, Context/Setting, Quality, Duration, Modifying Factors, Severity.)      Lucinda Nava is a 49 y.o. female who presents with lightheadedness and near syncope. Patient states she was standing at work when she began to feel lightheaded and began to fall. She was caught by coworkers and did not hit her head. She denies LOC. She states that over the past day she has been experiencing some fatigue and generalized weakness. She denies any recent illnesses. She denies any medical history or current medications. She denies chest pain, shortness of breath, headaches, dizziness, vertigo, abdominal pain, nausea, vomiting, diarrhea, fevers, chills, numbness, tingling, weakness. PAST MEDICAL / SURGICAL / SOCIAL / FAMILY HISTORY      has a past medical history of Fibroid tumor. has a past surgical history that includes Hysterectomy; LASIK; and Breast enhancement surgery.       Social History     Socioeconomic History    Marital status:      Spouse name: Not on file    Number of children: Not on file    Years of education: Not on file    Highest education level: Not on file   Occupational History    Not on file   Tobacco Use    Smoking status: Never    Smokeless tobacco: Never   Vaping Use    Vaping Use: Never used   Substance and Sexual Activity    Alcohol use: Not Currently    Drug use: Never    Sexual activity: Yes     Partners: Male   Other Topics Concern    Not on file   Social History Narrative    Not on file     Social Determinants of Health     Financial Resource Strain: Not on file   Food Insecurity: Not on file   Transportation Needs: Not on file   Physical Activity: Not on file   Stress: Not on file   Social Connections: Not on file   Intimate Partner Violence: Not on file   Housing Stability: Not on file       Family History   Problem Relation Age of Onset    Diabetes Mother     Lung Cancer Father         mets to liver    Alcohol Abuse Father     Diabetes Sister        Allergies:  Patient has no known allergies. Home Medications:  Prior to Admission medications    Medication Sig Start Date End Date Taking? Authorizing Provider   naproxen (NAPROSYN) 500 MG tablet Take 1 tablet by mouth 2 times daily as needed for Pain 9/26/20   Cristo Mauro DO   celecoxib (CELEBREX) 200 MG capsule Take 1 capsule by mouth daily 3/27/19   Jayde Bran PA-C   Doxylamine Succinate, Sleep, (UNISOM PO) Take by mouth as needed (sleep)    Historical Provider, MD       REVIEW OF SYSTEMS    (2-9 systems for level 4, 10 or more for level 5)      Review of Systems   Constitutional:  Positive for chills and unexpected weight change (Weight loss over the past few months). Negative for activity change, appetite change, fatigue and fever. HENT:  Negative for congestion, rhinorrhea and sore throat. Eyes:  Negative for visual disturbance. Respiratory:  Negative for cough and shortness of breath.     Cardiovascular: Positive for palpitations (Intermittent palpitations over the past few months). Negative for chest pain. Gastrointestinal:  Positive for diarrhea (Increased frequency of diarrhea x 2 months). Negative for abdominal pain, nausea and vomiting. Genitourinary:  Negative for dysuria and hematuria. Musculoskeletal:  Negative for arthralgias and myalgias. Skin:  Negative for pallor and rash. Neurological:  Positive for light-headedness. Negative for dizziness, tremors, seizures, syncope (Near syncope), facial asymmetry, speech difficulty, weakness, numbness and headaches. All other systems reviewed and are negative. PHYSICAL EXAM   (up to 7 for level 4, 8 or more for level 5)      INITIAL VITALS:   BP (!) 145/89   Pulse 80   Temp 97.7 °F (36.5 °C)   Resp 20   SpO2 100%     Physical Exam  Vitals reviewed. Constitutional:       General: She is not in acute distress. Appearance: She is not toxic-appearing. HENT:      Head: Normocephalic and atraumatic. Mouth/Throat:      Mouth: Mucous membranes are moist.      Pharynx: Oropharynx is clear. Eyes:      Extraocular Movements: Extraocular movements intact. Pupils: Pupils are equal, round, and reactive to light. Comments: Exopthalmos   Cardiovascular:      Rate and Rhythm: Normal rate and regular rhythm. Pulses: Normal pulses. Heart sounds: Normal heart sounds. Pulmonary:      Effort: Pulmonary effort is normal.      Breath sounds: Normal breath sounds. No decreased breath sounds, wheezing, rhonchi or rales. Abdominal:      Palpations: Abdomen is soft. Tenderness: There is no abdominal tenderness. There is no guarding or rebound. Musculoskeletal:         General: Normal range of motion. Cervical back: Normal range of motion and neck supple. Right lower leg: No edema. Left lower leg: No edema. Skin:     Capillary Refill: Capillary refill takes less than 2 seconds. Coloration: Skin is not pale. Findings: No rash. Neurological:      General: No focal deficit present. Mental Status: She is alert and oriented to person, place, and time. Motor: No weakness.        DIFFERENTIAL  DIAGNOSIS     PLAN (LABS / IMAGING / EKG):  Orders Placed This Encounter   Procedures    CBC with Auto Differential    TSH w/reflex to FT4    Comprehensive Metabolic Panel w/ Reflex to MG    Urinalysis with Reflex to Culture    Troponin    T4, Free    EKG 12 Lead       MEDICATIONS ORDERED:  Orders Placed This Encounter   Medications    0.9 % sodium chloride bolus       DDX: Cardiac arrhythmia/ valvular, low glucose, anemia, SAH, dissection, PE, AAA rupture, ectopic pregnancy rupture, seizure, vasovagal, orthostatic    DIAGNOSTIC RESULTS / EMERGENCY DEPARTMENT COURSE / MDM   LAB RESULTS:  Results for orders placed or performed during the hospital encounter of 07/25/22   CBC with Auto Differential   Result Value Ref Range    WBC 6.1 3.5 - 11.0 k/uL    RBC 4.42 4.0 - 5.2 m/uL    Hemoglobin 12.2 12.0 - 16.0 g/dL    Hematocrit 35.9 (L) 36 - 46 %    MCV 81.1 80 - 100 fL    MCH 27.6 26 - 34 pg    MCHC 34.1 31 - 37 g/dL    RDW 13.5 11.5 - 14.9 %    Platelets 860 793 - 646 k/uL    MPV 8.6 6.0 - 12.0 fL    Seg Neutrophils 52 36 - 66 %    Lymphocytes 36 24 - 44 %    Monocytes 8 (H) 1 - 7 %    Eosinophils % 3 0 - 4 %    Basophils 1 0 - 2 %    Segs Absolute 3.30 1.3 - 9.1 k/uL    Absolute Lymph # 2.20 1.0 - 4.8 k/uL    Absolute Mono # 0.50 0.1 - 1.3 k/uL    Absolute Eos # 0.20 0.0 - 0.4 k/uL    Basophils Absolute 0.00 0.0 - 0.2 k/uL   TSH w/reflex to FT4   Result Value Ref Range    TSH <0.01 (L) 0.30 - 5.00 uIU/mL   Comprehensive Metabolic Panel w/ Reflex to MG   Result Value Ref Range    Glucose 111 (H) 70 - 99 mg/dL    BUN 12 6 - 20 mg/dL    Creatinine <0.40 (L) 0.50 - 0.90 mg/dL    Calcium 9.1 8.6 - 10.4 mg/dL    Sodium 139 135 - 144 mmol/L    Potassium 3.9 3.7 - 5.3 mmol/L    Chloride 106 98 - 107 mmol/L    CO2 24 20 - 31 mmol/L Anion Gap 9 9 - 17 mmol/L    Alkaline Phosphatase 190 (H) 35 - 104 U/L    ALT 23 5 - 33 U/L    AST 20 <32 U/L    Total Bilirubin 0.33 0.3 - 1.2 mg/dL    Total Protein 6.9 6.4 - 8.3 g/dL    Albumin 4.0 3.5 - 5.2 g/dL    GFR Non- Can not be calculated >60 mL/min    GFR  Can not be calculated >60 mL/min    GFR Comment         Urinalysis with Reflex to Culture    Specimen: Urine   Result Value Ref Range    Color, UA Yellow Yellow    Turbidity UA Clear Clear    Glucose, Ur NEGATIVE NEGATIVE    Bilirubin Urine NEGATIVE NEGATIVE    Ketones, Urine NEGATIVE NEGATIVE    Specific Gravity, UA 1.008 1.000 - 1.030    Urine Hgb NEGATIVE NEGATIVE    pH, UA 7.0 5.0 - 8.0    Protein, UA NEGATIVE NEGATIVE    Urobilinogen, Urine Normal Normal    Nitrite, Urine NEGATIVE NEGATIVE    Leukocyte Esterase, Urine NEGATIVE NEGATIVE    Urinalysis Comments       Microscopic exam not performed based on chemical results unless requested in original order. Troponin   Result Value Ref Range    Troponin, High Sensitivity 6 0 - 14 ng/L   T4, Free   Result Value Ref Range    Thyroxine, Free 3.37 (H) 0.93 - 1.70 ng/dL   EKG 12 Lead   Result Value Ref Range    Ventricular Rate 84 BPM    Atrial Rate 84 BPM    P-R Interval 142 ms    QRS Duration 70 ms    Q-T Interval 410 ms    QTc Calculation (Bazett) 484 ms    P Axis 56 degrees    R Axis 44 degrees    T Axis 55 degrees       IMPRESSION: 51-year-old female presents with lightheadedness and near syncope. Patient was standing at work when she began to fall, no head trauma no LOC. Patient denies chest pain or shortness of breath. Vitals stable on arrival.  On examination, patient has mild exophthalmos. Heart RRR, lungs CTA B. Abdomen soft and nontender in all quadrants. No medical history or medications. She denies any complaints currently while in the emergency department, states she feels much better at this time.   Will initiate cardiac work-up, CBC, CMP, UA, Trop, EKG, and TSH. Ultrasound of IVC showed moderate compressibility. Patient given NS.    TSH undetectable. Thyroxine 3.37. Discussed thyroid disorders, who upon further reflection noted that she has been experiencing cold sweats, weight loss, diarrhea, tremulousness, palpitations and exopthalmos intermittently for the past two months but had not previously thought anything of the symptoms. Discussed close follow up with PCP and the need for further thyroid testing. Patient states she will call her PCP in the morning. Return precautions discussed. Patient voiced understanding and agreement with plan. EMERGENCY DEPARTMENT COURSE:  ED Course as of 07/26/22 1203   Mon Jul 25, 2022 2121 TSH(!): <0.01 [JG]   2123 EKG Interpretation    Interpreted by emergency department physician    Rhythm: normal sinus   Rate: normal  Axis: normal  Ectopy: none  Conduction: normal  ST Segments: normal  T Waves: normal  Q Waves: none    Clinical Impression: non-specific EKG    Ashish Mckoy MD   [JG]   2142 Thyroxine, Free(!): 3.37 [JG]      ED Course User Index  [JG] Ashish Mckoy MD         PROCEDURES:  ABDOMINAL AORTIC ULTRASOUND:      INDICATION:  The medical necessity was to evaluate the fluid status of the patient via presence or absence of collapse of the inferior vena cava. PROCEDURE:  A limited, bedside ultrasound of the IVC was performed. Using the low frequency convex probe, longitudinal and transverse views of structures of the IVC were obtained.   Using m-mode and the probe in longitudinal view the patient performed a sniff test.  Measurements were obtained before and during the sniff test with percent collapse of the IVC calculated    FINDINGS:    IVC Size (cm) Respiratory Change CVP (cm)   < 1.5 Total Collapse 0 - 5   1.5 - 2.5 > 50% Collapse 5 - 10   1.5 - 2.5 < 50% Collapse 11 - 15   > 2.5 < 50 % Collapse 16 - 20   > 2.5 No Change > 20     IVC size pre-sniff 2 and during sniff 1.5 with percent collapse <50%  The study was technically adequate    IMAGES:  Electronically uploaded to the PACS system    Conor Vernon MD  Emergency Medicine Resident     CONSULTS:  None    FINAL IMPRESSION      1. Near syncope    2.  Thyroid disorder          DISPOSITION / PLAN     DISPOSITION Decision To Discharge 07/25/2022 10:55:40 PM      PATIENT REFERRED TO:  Garfield Valenzuela Jd Olson [address] [facility] [address].  [phone]    Schedule an appointment as soon as possible for a visit in 2 days      [facility] ED  [address] [id number] [phone]  Go to   As needed, If symptoms worsen    DISCHARGE MEDICATIONS:  Discharge Medication List as of 7/25/2022 10:58 PM          Conor Vernon MD  Emergency Medicine Resident    (Please note that portions of thisnote were completed with a voice recognition program.  Efforts were made to edit the dictations but occasionally words are mis-transcribed.)       Conor Vernon MD  Resident  07/26/22 3475

## 2022-07-29 ENCOUNTER — OFFICE VISIT (OUTPATIENT)
Dept: PRIMARY CARE CLINIC | Age: 57
End: 2022-07-29
Payer: OTHER GOVERNMENT

## 2022-07-29 VITALS
DIASTOLIC BLOOD PRESSURE: 96 MMHG | WEIGHT: 153.8 LBS | HEART RATE: 78 BPM | OXYGEN SATURATION: 98 % | BODY MASS INDEX: 25.62 KG/M2 | SYSTOLIC BLOOD PRESSURE: 144 MMHG | HEIGHT: 65 IN

## 2022-07-29 DIAGNOSIS — E05.90 HYPERTHYROIDISM: ICD-10-CM

## 2022-07-29 DIAGNOSIS — Z00.00 HEALTH CARE MAINTENANCE: ICD-10-CM

## 2022-07-29 DIAGNOSIS — M79.642 BILATERAL HAND PAIN: ICD-10-CM

## 2022-07-29 DIAGNOSIS — M79.641 BILATERAL HAND PAIN: ICD-10-CM

## 2022-07-29 DIAGNOSIS — E05.90 HYPERTHYROIDISM: Primary | ICD-10-CM

## 2022-07-29 PROBLEM — D64.9 ANEMIA: Status: ACTIVE | Noted: 2022-07-29

## 2022-07-29 PROBLEM — H52.209 ASTIGMATISM: Status: ACTIVE | Noted: 2022-07-29

## 2022-07-29 PROBLEM — F43.22 ADJUSTMENT DISORDER WITH ANXIETY: Status: ACTIVE | Noted: 2022-07-29

## 2022-07-29 PROBLEM — M75.02 ADHESIVE CAPSULITIS OF LEFT SHOULDER: Status: ACTIVE | Noted: 2022-07-29

## 2022-07-29 PROBLEM — Z90.711 ACQUIRED ABSENCE OF UTERUS WITH REMAINING CERVICAL STUMP: Status: ACTIVE | Noted: 2022-07-29

## 2022-07-29 PROBLEM — D25.9 UTERINE LEIOMYOMA: Status: ACTIVE | Noted: 2022-07-29

## 2022-07-29 PROBLEM — K64.9 HEMORRHOIDS: Status: ACTIVE | Noted: 2022-07-29

## 2022-07-29 PROBLEM — G47.00 INSOMNIA: Status: ACTIVE | Noted: 2022-07-29

## 2022-07-29 LAB
ALT SERPL-CCNC: 21 U/L (ref 5–33)
ANION GAP SERPL CALCULATED.3IONS-SCNC: 14 MMOL/L (ref 9–17)
AST SERPL-CCNC: 24 U/L
BUN BLDV-MCNC: 12 MG/DL (ref 6–20)
CALCIUM SERPL-MCNC: 9.5 MG/DL (ref 8.6–10.4)
CHLORIDE BLD-SCNC: 106 MMOL/L (ref 98–107)
CHOLESTEROL/HDL RATIO: 2.7
CHOLESTEROL: 197 MG/DL
CO2: 19 MMOL/L (ref 20–31)
CREAT SERPL-MCNC: 0.37 MG/DL (ref 0.5–0.9)
GFR AFRICAN AMERICAN: >60 ML/MIN
GFR NON-AFRICAN AMERICAN: >60 ML/MIN
GFR SERPL CREATININE-BSD FRML MDRD: ABNORMAL ML/MIN/{1.73_M2}
GLUCOSE FASTING: 109 MG/DL (ref 70–99)
HDLC SERPL-MCNC: 74 MG/DL
LDL CHOLESTEROL: 111 MG/DL (ref 0–130)
POTASSIUM SERPL-SCNC: 4.4 MMOL/L (ref 3.7–5.3)
SODIUM BLD-SCNC: 139 MMOL/L (ref 135–144)
T3 FREE: 12 PG/ML (ref 2.02–4.43)
THYROXINE, FREE: 2.85 NG/DL (ref 0.93–1.7)
TRIGL SERPL-MCNC: 62 MG/DL
TSH SERPL DL<=0.05 MIU/L-ACNC: <0.01 UIU/ML (ref 0.3–5)

## 2022-07-29 PROCEDURE — 99204 OFFICE O/P NEW MOD 45 MIN: CPT | Performed by: PHYSICIAN ASSISTANT

## 2022-07-29 RX ORDER — METHIMAZOLE 5 MG/1
5 TABLET ORAL 3 TIMES DAILY
Qty: 30 TABLET | Refills: 0 | Status: SHIPPED | OUTPATIENT
Start: 2022-07-29 | End: 2022-08-16 | Stop reason: DRUGHIGH

## 2022-07-29 SDOH — ECONOMIC STABILITY: FOOD INSECURITY: WITHIN THE PAST 12 MONTHS, THE FOOD YOU BOUGHT JUST DIDN'T LAST AND YOU DIDN'T HAVE MONEY TO GET MORE.: NEVER TRUE

## 2022-07-29 SDOH — ECONOMIC STABILITY: FOOD INSECURITY: WITHIN THE PAST 12 MONTHS, YOU WORRIED THAT YOUR FOOD WOULD RUN OUT BEFORE YOU GOT MONEY TO BUY MORE.: NEVER TRUE

## 2022-07-29 ASSESSMENT — PATIENT HEALTH QUESTIONNAIRE - PHQ9
SUM OF ALL RESPONSES TO PHQ QUESTIONS 1-9: 0
SUM OF ALL RESPONSES TO PHQ QUESTIONS 1-9: 0
2. FEELING DOWN, DEPRESSED OR HOPELESS: 0
SUM OF ALL RESPONSES TO PHQ QUESTIONS 1-9: 0
SUM OF ALL RESPONSES TO PHQ QUESTIONS 1-9: 0
SUM OF ALL RESPONSES TO PHQ9 QUESTIONS 1 & 2: 0
1. LITTLE INTEREST OR PLEASURE IN DOING THINGS: 0

## 2022-07-29 ASSESSMENT — ENCOUNTER SYMPTOMS
SHORTNESS OF BREATH: 1
DIARRHEA: 1

## 2022-07-29 ASSESSMENT — SOCIAL DETERMINANTS OF HEALTH (SDOH): HOW HARD IS IT FOR YOU TO PAY FOR THE VERY BASICS LIKE FOOD, HOUSING, MEDICAL CARE, AND HEATING?: NOT HARD AT ALL

## 2022-07-29 NOTE — PROGRESS NOTES
[facility] PRIMARY CARE  [address]  [facility] [address]  Dept: [address] Valentín Pardo is a 49 y.o. female who presents today as an new patient for her medical conditionsas noted below. Chief Complaint   Patient presents with    Follow-up     Patient is here today for ED follow up. Patient states she was seen in [facility] ED. Patient states she \"passed out\" at work on Monday 7/25/22. Patient states she missed work this week. Patient states she will need work note to return to work. Patient states she is feeling better today. Patient states she was told her TSH was low, patient states she believes that is why she is losing so much weight. Hand Pain     Stiff per pt-- patient thinks it is work related. 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 Patient states possible arthritis        HPI:     Hand Pain   Pertinent negatives include no chest pain. NO CP, not sure of SOB, no HA. Did not feel good the day before but did go to work. Passed out at work. She felt weak but she did not think she was dehydrated. Does feel SOB at work walking. Started last week. Has to poop everytime she eats ever since she had the 2nd Covid vaccine.        LDL Cholesterol (mg/dL)   Date Value   03/13/2019 127       (goal LDL is <100)   AST (U/L)   Date Value   07/25/2022 20     ALT (U/L)   Date Value   07/25/2022 23     BUN (mg/dL)   Date Value   07/25/2022 12     BP Readings from Last 3 Encounters:   07/29/22 (!) 148/98   07/25/22 (!) 145/89   08/28/21 (!) 144/76          (goal 120/80)    Past Medical History:   Diagnosis Date    Fibroid tumor       Past Surgical History:   Procedure Laterality Date    BREAST ENHANCEMENT SURGERY      HYSTERECTOMY (CERVIX STATUS UNKNOWN)      unsure of total    LASIK         Family History   Problem Relation Age of Onset    Diabetes Mother     Lung Cancer Father         mets to liver    Alcohol Abuse Father     Diabetes Sister Social History     Tobacco Use    Smoking status: Never    Smokeless tobacco: Never   Substance Use Topics    Alcohol use: Not Currently      Current Outpatient Medications   Medication Sig Dispense Refill    diclofenac sodium (VOLTAREN) 1 % GEL Apply 2 g topically in the morning and 2 g at noon and 2 g in the evening and 2 g before bedtime. 100 g 1    methIMAzole (TAPAZOLE) 5 MG tablet Take 1 tablet by mouth in the morning and 1 tablet at noon and 1 tablet before bedtime. 30 tablet 0     No current facility-administered medications for this visit. No Known Allergies    Health Maintenance   Topic Date Due    HIV screen  Never done    Hepatitis C screen  Never done    Colorectal Cancer Screen  Never done    Breast cancer screen  Never done    Shingles vaccine (1 of 2) Never done    COVID-19 Vaccine (3 - Booster for Pfizer series) 01/06/2022    Diabetes screen  03/13/2022    Flu vaccine (1) 09/01/2022    Depression Screen  07/29/2023    Lipids  03/13/2024    DTaP/Tdap/Td vaccine (2 - Td or Tdap) 03/13/2029    Hepatitis A vaccine  Aged Out    Hepatitis B vaccine  Aged Out    Hib vaccine  Aged Out    Meningococcal (ACWY) vaccine  Aged Out    Pneumococcal 0-64 years Vaccine  Aged Out       Subjective:     Review of Systems   Constitutional:  Positive for appetite change and unexpected weight change. Negative for diaphoresis and fever. Respiratory:  Positive for shortness of breath. Cardiovascular:  Positive for palpitations. Negative for chest pain. Gastrointestinal:  Positive for diarrhea. Endocrine: Positive for heat intolerance. Neurological:  Positive for syncope and weakness. Psychiatric/Behavioral:  The patient is nervous/anxious. Objective:     BP (!) 148/98   Pulse 78   Ht 5' 5\" (1.651 m)   Wt 153 lb 12.8 oz (69.8 kg)   SpO2 98%   BMI 25.59 kg/m²   Physical Exam  Vitals and nursing note reviewed. Constitutional:       Appearance: Normal appearance.    Cardiovascular:      Rate and Rhythm: Normal rate and regular rhythm. Heart sounds: Normal heart sounds. Pulmonary:      Effort: Pulmonary effort is normal.      Breath sounds: Normal breath sounds. No wheezing, rhonchi or rales. Abdominal:      General: Abdomen is flat. Bowel sounds are normal. There is no distension. Palpations: There is no mass. Tenderness: There is no abdominal tenderness. There is no guarding or rebound. Neurological:      Mental Status: She is alert and oriented to person, place, and time. Psychiatric:         Mood and Affect: Mood is anxious. Affect is not angry. Assessment:       Diagnosis Orders   1. Hyperthyroidism  Omar Colindres MD, Endocrinology, [facility]    T4, Free    T3, Free    Thyroid Antibodies      2. Bilateral hand pain        3. Health care maintenance  Lipid Panel    ALT    AST    Basic Metabolic Panel, Fasting           Plan:    Recheck BP  Reviewed BW and ER notes. Referral to Endo  Start metamazole 5mg 1 po tid     Return in about 3 weeks (around 8/19/2022).     Orders Placed This Encounter   Procedures    TSH     Standing Status:   Future     Standing Expiration Date:   7/29/2023    T4, Free     Standing Status:   Future     Standing Expiration Date:   7/29/2023    Lipid Panel     Standing Status:   Future     Standing Expiration Date:   7/29/2023     Order Specific Question:   Is Patient Fasting?/# of Hours     Answer:   10-12 hours    ALT     Standing Status:   Future     Standing Expiration Date:   7/29/2023    AST     Standing Status:   Future     Standing Expiration Date:   7/29/2023    T3, Free     Standing Status:   Future     Standing Expiration Date:   7/29/2023    Thyroid Antibodies     Standing Status:   Future     Standing Expiration Date:   0/64/7292    Basic Metabolic Panel, Fasting     Standing Status:   Future     Standing Expiration Date:   7/29/2023    Omar Colindres MD, Endocrinology, [facility]     Referral Priority: Routine     Referral Type:   Eval and Treat     Referral Reason:   Specialty Services Required     Referred to Provider:   Kevin Isaacs     Requested Specialty:   Endocrinology     Number of Visits Requested:   1       Orders Placed This Encounter   Medications    diclofenac sodium (VOLTAREN) 1 % GEL     Sig: Apply 2 g topically in the morning and 2 g at noon and 2 g in the evening and 2 g before bedtime. Dispense:  100 g     Refill:  1    methIMAzole (TAPAZOLE) 5 MG tablet     Sig: Take 1 tablet by mouth in the morning and 1 tablet at noon and 1 tablet before bedtime. Dispense:  30 tablet     Refill:  0         Patient given educationalmaterials - see patient instructions. Discussed use, benefit, and side effectsof prescribed medications. All patient questions answered. Pt voiced understanding. Reviewed health maintenance. Instructed to continue current medications, diet andexercise. Patient agreed with treatment plan. Follow up as directed.      Electronicallysigned by Reece Claude, PA-C on 7/29/2022 at 8:50 AM

## 2022-08-02 LAB
THYROGLOBULIN AB: 120 IU/ML (ref 0–40)
THYROID PEROXIDASE (TPO) AB: 186 IU/ML (ref 0–25)

## 2022-08-04 DIAGNOSIS — E05.90 HYPERTHYROIDISM: Primary | ICD-10-CM

## 2022-08-16 ENCOUNTER — OFFICE VISIT (OUTPATIENT)
Dept: PRIMARY CARE CLINIC | Age: 57
End: 2022-08-16
Payer: OTHER GOVERNMENT

## 2022-08-16 VITALS
OXYGEN SATURATION: 99 % | HEIGHT: 65 IN | SYSTOLIC BLOOD PRESSURE: 144 MMHG | BODY MASS INDEX: 25.83 KG/M2 | DIASTOLIC BLOOD PRESSURE: 94 MMHG | WEIGHT: 155 LBS | HEART RATE: 77 BPM

## 2022-08-16 DIAGNOSIS — R03.0 ELEVATED BP WITHOUT DIAGNOSIS OF HYPERTENSION: ICD-10-CM

## 2022-08-16 DIAGNOSIS — Z12.31 ENCOUNTER FOR SCREENING MAMMOGRAM FOR MALIGNANT NEOPLASM OF BREAST: ICD-10-CM

## 2022-08-16 DIAGNOSIS — E05.90 HYPERTHYROIDISM: Primary | ICD-10-CM

## 2022-08-16 DIAGNOSIS — K59.00 CONSTIPATION, UNSPECIFIED CONSTIPATION TYPE: ICD-10-CM

## 2022-08-16 DIAGNOSIS — G47.00 INSOMNIA, UNSPECIFIED TYPE: ICD-10-CM

## 2022-08-16 DIAGNOSIS — Z12.11 COLON CANCER SCREENING: ICD-10-CM

## 2022-08-16 PROCEDURE — 99213 OFFICE O/P EST LOW 20 MIN: CPT | Performed by: PHYSICIAN ASSISTANT

## 2022-08-16 RX ORDER — METHIMAZOLE 5 MG/1
5 TABLET ORAL 2 TIMES DAILY
Qty: 30 TABLET | Refills: 3 | Status: SHIPPED | OUTPATIENT
Start: 2022-08-16 | End: 2022-09-15

## 2022-08-16 ASSESSMENT — ENCOUNTER SYMPTOMS
EYE REDNESS: 0
NAUSEA: 0
SORE THROAT: 0
RHINORRHEA: 0
WHEEZING: 0
SHORTNESS OF BREATH: 0
ABDOMINAL PAIN: 0
COUGH: 0
EYE DISCHARGE: 0
DIARRHEA: 0
VOMITING: 0
CONSTIPATION: 1

## 2022-08-16 ASSESSMENT — PATIENT HEALTH QUESTIONNAIRE - PHQ9
SUM OF ALL RESPONSES TO PHQ QUESTIONS 1-9: 0
SUM OF ALL RESPONSES TO PHQ QUESTIONS 1-9: 0
2. FEELING DOWN, DEPRESSED OR HOPELESS: 0
SUM OF ALL RESPONSES TO PHQ QUESTIONS 1-9: 0
SUM OF ALL RESPONSES TO PHQ9 QUESTIONS 1 & 2: 0
1. LITTLE INTEREST OR PLEASURE IN DOING THINGS: 0
SUM OF ALL RESPONSES TO PHQ QUESTIONS 1-9: 0

## 2022-08-16 NOTE — PROGRESS NOTES
[facility] PRIMARY CARE  [address] [facility] [address]  Dept: 100 Valentín Pardo is a 49 y.o. female who presents today for her medical conditions/complaints as noted below. Chief Complaint   Patient presents with    Follow-up     3 week f/u- patient said she is scheduled with Endo 10/10/22. Patient did not have labs re done. Medication Problem     Patient states she thinks she is having issues with having BM and sleeping since starting Metamazole       HPI:     HPI  Unisom for 17 years because she has not been able to fall asleep since she was 15. Corry Campos worked until insomnia on third day of methimazole.    Will be working 12 hours at [id number] methimazole 5-7 days ago because she ran out   LDL Cholesterol (mg/dL)   Date Value   07/29/2022 111   03/13/2019 127       (goal LDL is <100)   AST (U/L)   Date Value   07/29/2022 24     ALT (U/L)   Date Value   07/29/2022 21     BUN (mg/dL)   Date Value   07/29/2022 12     BP Readings from Last 3 Encounters:   08/16/22 (!) 144/94   07/29/22 (!) 144/96   07/25/22 (!) 145/89          (goal 120/80)    Past Medical History:   Diagnosis Date    Fibroid tumor       Past Surgical History:   Procedure Laterality Date    BREAST ENHANCEMENT SURGERY      HYSTERECTOMY (CERVIX STATUS UNKNOWN)      unsure of total    LASIK         Family History   Problem Relation Age of Onset    Diabetes Mother     Lung Cancer Father         mets to liver    Alcohol Abuse Father     Diabetes Sister        Social History     Tobacco Use    Smoking status: Never    Smokeless tobacco: Never   Substance Use Topics    Alcohol use: Not Currently      Current Outpatient Medications   Medication Sig Dispense Refill    methIMAzole (TAPAZOLE) 5 MG tablet Take 1 tablet by mouth in the morning and at bedtime 30 tablet 3    diclofenac sodium (VOLTAREN) 1 % GEL Apply 2 g topically in the morning and 2 g at noon and 2 g in the evening and 2 g before bedtime. 100 g 1     No current facility-administered medications for this visit. No Known Allergies    Health Maintenance   Topic Date Due    HIV screen  Never done    Hepatitis C screen  Never done    Colorectal Cancer Screen  Never done    Breast cancer screen  Never done    Shingles vaccine (1 of 2) 08/16/2023 (Originally 12/27/2017)    COVID-19 Vaccine (3 - Booster for Rajan Peter series) 08/16/2023 (Originally 1/6/2022)    Flu vaccine (1) 09/01/2022    Depression Screen  08/16/2023    Diabetes screen  07/29/2025    Lipids  07/29/2027    DTaP/Tdap/Td vaccine (2 - Td or Tdap) 03/13/2029    Hepatitis A vaccine  Aged Out    Hepatitis B vaccine  Aged Out    Hib vaccine  Aged Out    Meningococcal (ACWY) vaccine  Aged Out    Pneumococcal 0-64 years Vaccine  Aged Out       Subjective:      Review of Systems   Constitutional:  Negative for chills, diaphoresis and fever. HENT:  Negative for rhinorrhea and sore throat. Eyes:  Negative for discharge and redness. Respiratory:  Negative for cough, shortness of breath and wheezing. Cardiovascular:  Negative for chest pain and palpitations. Gastrointestinal:  Positive for constipation. Negative for abdominal pain, diarrhea, nausea and vomiting. Genitourinary:  Negative for dysuria and frequency. Musculoskeletal:  Negative for arthralgias and myalgias. Neurological:  Negative for dizziness, light-headedness and headaches. Psychiatric/Behavioral:  Positive for sleep disturbance. Negative for decreased concentration (thinking very clearly and working fast). Objective:     BP (!) 144/94   Pulse 77   Ht 5' 5\" (1.651 m)   Wt 155 lb (70.3 kg)   SpO2 99%   BMI 25.79 kg/m²   Physical Exam  Vitals and nursing note reviewed. Constitutional:       Appearance: Normal appearance. Cardiovascular:      Rate and Rhythm: Normal rate and regular rhythm. Heart sounds: Normal heart sounds.    Pulmonary:      Effort: Pulmonary effort is normal.      Breath sounds: Normal breath sounds. Musculoskeletal:      Right lower leg: No edema. Left lower leg: No edema. Neurological:      Mental Status: She is alert and oriented to person, place, and time. Psychiatric:         Mood and Affect: Mood normal.       Assessment:       Diagnosis Orders   1. Hyperthyroidism  methIMAzole (TAPAZOLE) 5 MG tablet      2. Encounter for screening mammogram for malignant neoplasm of breast  CHA DIGITAL SCREEN W OR WO CAD BILATERAL      3. Insomnia, unspecified type  Baseline Diagnostic Sleep Study    [facility]      4. Colon cancer screening  Fecal DNA Colorectal cancer screening (Cologuard)      5. Constipation, unspecified constipation type        6. Elevated BP without diagnosis of hypertension               Plan:    Can use the Miralax for constipation  Restart methimazole at 5mg qd  Have labs drawn. See sleep specialist about sleep  Cologuard for colon cancer screening. Return in about 2 months (around 10/16/2022) for HTN. Orders Placed This Encounter   Procedures    Fecal DNA Colorectal cancer screening (Cologuard)    CHA DIGITAL SCREEN W OR WO CAD BILATERAL     Standing Status:   Future     Standing Expiration Date:   10/16/2023    [facility]     Referral Priority:   Routine     Referral Type:   Eval and Treat     Referral Reason:   Specialty Services Required     Referred to Provider:   Guanaco Leger MD     Requested Specialty:   Pulmonology     Number of Visits Requested:   1    Baseline Diagnostic Sleep Study     Standing Status:   Future     Standing Expiration Date:   8/16/2023     Order Specific Question:   Adult or Pediatric     Answer:   Adult Study (>7 Years)     Order Specific Question:   Location For Sleep Study     Answer:    [facility] Specific Question:   Select Sleep Lab Location     Answer:   [address]     Order Specific Question:   Pre-Study Patient Questions: Answer:   Complains of daytime sleepiness     Order Specific Question:   Pre-Study Patient Questions: Answer:   Reports multiple awakenings throughout the night       Orders Placed This Encounter   Medications    methIMAzole (TAPAZOLE) 5 MG tablet     Sig: Take 1 tablet by mouth in the morning and at bedtime     Dispense:  30 tablet     Refill:  3         Patient given educational materials - see patient instructions. Discussed use, benefit, and side effects of prescribed medications. All patient questions answered. Pt voiced understanding. Reviewed health maintenance. Instructed to continue current medications, diet and exercise. Patient agreed with treatment plan. Follow up as directed.      Electronically signed by Haylee Whitlock PA-C on 8/16/2022 at 4:37 PM

## 2022-08-19 ENCOUNTER — HOSPITAL ENCOUNTER (OUTPATIENT)
Age: 57
Discharge: HOME | End: 2022-08-19
Payer: OTHER GOVERNMENT

## 2022-08-19 DIAGNOSIS — E05.90 HYPERTHYROIDISM: ICD-10-CM

## 2022-08-19 LAB
T3 FREE: 10.88 PG/ML (ref 2.02–4.43)
THYROXINE, FREE: 2.99 NG/DL (ref 0.93–1.7)
TSH SERPL DL<=0.05 MIU/L-ACNC: <0.01 UIU/ML (ref 0.3–5)

## 2022-08-19 PROCEDURE — 84481 FREE ASSAY (FT-3): CPT

## 2022-08-19 PROCEDURE — 84439 ASSAY OF FREE THYROXINE: CPT

## 2022-08-19 PROCEDURE — 84443 ASSAY THYROID STIM HORMONE: CPT

## 2022-08-19 PROCEDURE — 36415 COLL VENOUS BLD VENIPUNCTURE: CPT

## 2022-09-23 ENCOUNTER — HOSPITAL ENCOUNTER (OUTPATIENT)
Dept: WOMENS IMAGING | Age: 57
Discharge: HOME | End: 2022-09-25
Payer: OTHER GOVERNMENT

## 2022-09-23 DIAGNOSIS — Z12.31 ENCOUNTER FOR SCREENING MAMMOGRAM FOR MALIGNANT NEOPLASM OF BREAST: ICD-10-CM

## 2022-09-23 PROCEDURE — 77063 BREAST TOMOSYNTHESIS BI: CPT

## 2022-09-24 LAB — NONINV COLON CA DNA+OCC BLD SCRN STL QL: NEGATIVE

## 2022-10-20 ENCOUNTER — HOSPITAL ENCOUNTER (OUTPATIENT)
Dept: NUCLEAR MEDICINE | Age: 57
Discharge: HOME | End: 2022-10-22
Payer: OTHER GOVERNMENT

## 2022-10-20 DIAGNOSIS — E05.90 HYPERTHYROIDISM: ICD-10-CM

## 2022-10-20 PROCEDURE — 78013 THYROID IMAGING W/BLOOD FLOW: CPT

## 2022-12-06 ENCOUNTER — TELEPHONE (OUTPATIENT)
Dept: PRIMARY CARE CLINIC | Age: 57
End: 2022-12-06

## 2022-12-06 DIAGNOSIS — Z11.4 ENCOUNTER FOR SCREENING FOR HIV: ICD-10-CM

## 2022-12-06 DIAGNOSIS — E05.90 HYPERTHYROIDISM: ICD-10-CM

## 2022-12-06 DIAGNOSIS — D64.9 ANEMIA, UNSPECIFIED TYPE: ICD-10-CM

## 2022-12-06 DIAGNOSIS — R73.01 IMPAIRED FASTING GLUCOSE: ICD-10-CM

## 2022-12-06 DIAGNOSIS — Z00.00 GENERAL MEDICAL EXAM: Primary | ICD-10-CM

## 2022-12-06 DIAGNOSIS — Z11.59 NEED FOR HEPATITIS C SCREENING TEST: ICD-10-CM

## 2022-12-06 NOTE — TELEPHONE ENCOUNTER
Patient had BMP on 07/29/2022. Thyroid labs done on 08/19/2022. She is due for Hep C screen and HIV screen on Health Maintenance - orders pended. Please advise on any other labs you'd like completed.

## 2022-12-06 NOTE — TELEPHONE ENCOUNTER
----- Message from [address] sent at 12/6/2022  9:35 AM EST -----  Subject: Referral Request    Reason for referral request? Rosalinda Seay has a diabetic check appt. 12/13/2022   and would like to get labs done prior. Please send order. Thank you  Provider patient wants to be referred to(if known):     Provider Phone Number(if known):     Additional Information for Provider?   ---------------------------------------------------------------------------  --------------  [address]    [phone]; OK to leave message on voicemail  ---------------------------------------------------------------------------  --------------

## 2022-12-09 ENCOUNTER — HOSPITAL ENCOUNTER (OUTPATIENT)
Age: 57
Discharge: HOME | End: 2022-12-09
Payer: OTHER GOVERNMENT

## 2022-12-09 DIAGNOSIS — D64.9 ANEMIA, UNSPECIFIED TYPE: ICD-10-CM

## 2022-12-09 DIAGNOSIS — Z11.59 NEED FOR HEPATITIS C SCREENING TEST: ICD-10-CM

## 2022-12-09 DIAGNOSIS — Z11.4 ENCOUNTER FOR SCREENING FOR HIV: ICD-10-CM

## 2022-12-09 DIAGNOSIS — Z00.00 GENERAL MEDICAL EXAM: ICD-10-CM

## 2022-12-09 DIAGNOSIS — R73.01 IMPAIRED FASTING GLUCOSE: ICD-10-CM

## 2022-12-09 DIAGNOSIS — E05.90 HYPERTHYROIDISM: ICD-10-CM

## 2022-12-09 LAB
ABSOLUTE EOS #: 0.3 K/UL (ref 0–0.4)
ABSOLUTE LYMPH #: 2.3 K/UL (ref 1–4.8)
ABSOLUTE MONO #: 0.4 K/UL (ref 0.1–1.3)
ANION GAP SERPL CALCULATED.3IONS-SCNC: 11 MMOL/L (ref 9–17)
BASOPHILS # BLD: 1 % (ref 0–2)
BASOPHILS ABSOLUTE: 0 K/UL (ref 0–0.2)
BUN BLDV-MCNC: 9 MG/DL (ref 6–20)
CALCIUM SERPL-MCNC: 8.7 MG/DL (ref 8.6–10.4)
CHLORIDE BLD-SCNC: 106 MMOL/L (ref 98–107)
CO2: 24 MMOL/L (ref 20–31)
CREAT SERPL-MCNC: 0.49 MG/DL (ref 0.5–0.9)
EOSINOPHILS RELATIVE PERCENT: 4 % (ref 0–4)
ESTIMATED AVERAGE GLUCOSE: 108 MG/DL
GFR SERPL CREATININE-BSD FRML MDRD: >60 ML/MIN/1.73M2
GLUCOSE FASTING: 116 MG/DL (ref 70–99)
HBA1C MFR BLD: 5.4 % (ref 4–6)
HCT VFR BLD CALC: 39.1 % (ref 36–46)
HEMOGLOBIN: 13.1 G/DL (ref 12–16)
HEPATITIS C ANTIBODY: NONREACTIVE
HIV AG/AB: NONREACTIVE
LYMPHOCYTES # BLD: 32 % (ref 24–44)
MCH RBC QN AUTO: 28.1 PG (ref 26–34)
MCHC RBC AUTO-ENTMCNC: 33.6 G/DL (ref 31–37)
MCV RBC AUTO: 83.6 FL (ref 80–100)
MONOCYTES # BLD: 5 % (ref 1–7)
PDW BLD-RTO: 14.1 % (ref 11.5–14.9)
PLATELET # BLD: 322 K/UL (ref 150–450)
PMV BLD AUTO: 8 FL (ref 6–12)
POTASSIUM SERPL-SCNC: 4 MMOL/L (ref 3.7–5.3)
RBC # BLD: 4.68 M/UL (ref 4–5.2)
SEG NEUTROPHILS: 58 % (ref 36–66)
SEGMENTED NEUTROPHILS ABSOLUTE COUNT: 4.3 K/UL (ref 1.3–9.1)
SODIUM BLD-SCNC: 141 MMOL/L (ref 135–144)
T3 FREE: 1.27 PG/ML (ref 2.02–4.43)
THYROXINE, FREE: 0.49 NG/DL (ref 0.93–1.7)
TSH SERPL DL<=0.05 MIU/L-ACNC: 0.02 UIU/ML (ref 0.3–5)
WBC # BLD: 7.2 K/UL (ref 3.5–11)

## 2022-12-09 PROCEDURE — 84439 ASSAY OF FREE THYROXINE: CPT

## 2022-12-09 PROCEDURE — 83036 HEMOGLOBIN GLYCOSYLATED A1C: CPT

## 2022-12-09 PROCEDURE — 86803 HEPATITIS C AB TEST: CPT

## 2022-12-09 PROCEDURE — 80048 BASIC METABOLIC PNL TOTAL CA: CPT

## 2022-12-09 PROCEDURE — 36415 COLL VENOUS BLD VENIPUNCTURE: CPT

## 2022-12-09 PROCEDURE — 84481 FREE ASSAY (FT-3): CPT

## 2022-12-09 PROCEDURE — 85025 COMPLETE CBC W/AUTO DIFF WBC: CPT

## 2022-12-09 PROCEDURE — 84443 ASSAY THYROID STIM HORMONE: CPT

## 2022-12-09 PROCEDURE — 87389 HIV-1 AG W/HIV-1&-2 AB AG IA: CPT

## 2022-12-13 ENCOUNTER — OFFICE VISIT (OUTPATIENT)
Dept: PRIMARY CARE CLINIC | Age: 57
End: 2022-12-13
Payer: OTHER GOVERNMENT

## 2022-12-13 VITALS
DIASTOLIC BLOOD PRESSURE: 92 MMHG | HEART RATE: 54 BPM | OXYGEN SATURATION: 97 % | WEIGHT: 161.2 LBS | SYSTOLIC BLOOD PRESSURE: 136 MMHG | BODY MASS INDEX: 26.86 KG/M2 | HEIGHT: 65 IN

## 2022-12-13 DIAGNOSIS — M79.89 THUMB SWELLING: ICD-10-CM

## 2022-12-13 DIAGNOSIS — M79.644 THUMB PAIN, RIGHT: ICD-10-CM

## 2022-12-13 DIAGNOSIS — E89.0 POSTABLATIVE HYPOTHYROIDISM: Primary | ICD-10-CM

## 2022-12-13 DIAGNOSIS — G47.00 INSOMNIA, UNSPECIFIED TYPE: ICD-10-CM

## 2022-12-13 PROBLEM — E05.00 TOXIC DIFFUSE GOITER WITHOUT CRISIS: Status: ACTIVE | Noted: 2022-12-13

## 2022-12-13 PROCEDURE — 99214 OFFICE O/P EST MOD 30 MIN: CPT | Performed by: PHYSICIAN ASSISTANT

## 2022-12-13 RX ORDER — ZOLPIDEM TARTRATE 5 MG/1
TABLET ORAL
COMMUNITY
Start: 2022-09-20

## 2022-12-13 RX ORDER — SUVOREXANT 15 MG/1
15 TABLET, FILM COATED ORAL NIGHTLY
Qty: 30 TABLET | Refills: 2 | Status: SHIPPED | OUTPATIENT
Start: 2022-12-13 | End: 2023-01-12

## 2022-12-13 NOTE — PROGRESS NOTES
[facility] PRIMARY CARE  [address]  [facility] [address]  Dept: 100 Valentín Pardo is a 49 y.o. female who presents today for her medical conditions/complaints as noted below. Chief Complaint   Patient presents with    Discuss Labs     Discuss labs-results in chart. Patient went to see Moshe Alonso. HPI:     HPI  Feeling back to hyperthyroid again. Did feel better after irradiation  10/20/22 but no longer. TSH is 0.02 with low TSH and low free T3    Right thumb is swollen and making it difficult to do her job. NO known injury. Pulmo/Sleep on Belsomra and using blueblocker sunglasses for drive home at night. Belsomra 15mg worked and 20mg was too much. Tried Unisom, melatonin  She was unable to get FMLA paperwork completed by Dr. Emely Blanca  LDL Cholesterol (mg/dL)   Date Value   07/29/2022 111   03/13/2019 127       (goal LDL is <100)   AST (U/L)   Date Value   07/29/2022 24     ALT (U/L)   Date Value   07/29/2022 21     BUN (mg/dL)   Date Value   12/09/2022 9     BP Readings from Last 3 Encounters:   12/13/22 (!) 136/92   08/16/22 (!) 144/94   07/29/22 (!) 144/96          (goal 120/80)    Past Medical History:   Diagnosis Date    Fibroid tumor       Past Surgical History:   Procedure Laterality Date    BREAST ENHANCEMENT SURGERY      HYSTERECTOMY (CERVIX STATUS UNKNOWN)      unsure of total    LASIK         Family History   Problem Relation Age of Onset    Diabetes Mother     Lung Cancer Father         mets to liver    Alcohol Abuse Father     Diabetes Sister        Social History     Tobacco Use    Smoking status: Never    Smokeless tobacco: Never   Substance Use Topics    Alcohol use: Not Currently      Current Outpatient Medications   Medication Sig Dispense Refill    zolpidem (AMBIEN) 5 MG tablet TAKE 1 TABLET BY MOUTH EVERY DAY AT BEDTIME      Suvorexant (BELSOMRA) 15 MG TABS Take 15 mg by mouth at bedtime for 30 days.  30 tablet 2     No current facility-administered medications for this visit. No Known Allergies    Health Maintenance   Topic Date Due    Flu vaccine (1) 08/01/2022    Shingles vaccine (1 of 2) 08/16/2023 (Originally 12/27/2017)    COVID-19 Vaccine (3 - Booster for Pfizer series) 08/16/2023 (Originally 10/1/2021)    Depression Screen  08/16/2023    Breast cancer screen  09/23/2024    Colorectal Cancer Screen  09/19/2025    Diabetes screen  12/09/2025    Lipids  07/29/2027    DTaP/Tdap/Td vaccine (2 - Td or Tdap) 03/13/2029    Hepatitis C screen  Completed    HIV screen  Completed    Hepatitis A vaccine  Aged Out    Hib vaccine  Aged Out    Meningococcal (ACWY) vaccine  Aged Out    Pneumococcal 0-64 years Vaccine  Aged Out       Subjective:      Review of Systems   Constitutional:  Positive for fatigue. Musculoskeletal:  Positive for arthralgias (right thumb) and myalgias. Objective:     BP (!) 136/92   Pulse 54   Ht 5' 5\" (1.651 m)   Wt 161 lb 3.2 oz (73.1 kg)   SpO2 97%   BMI 26.83 kg/m²   Physical Exam    Assessment:       Diagnosis Orders   1. Postablative hypothyroidism        2. Thumb swelling  Uric Acid    XR HAND RIGHT (MIN 3 VIEWS)      3. Insomnia, unspecified type  Suvorexant (BELSOMRA) 15 MG TABS      4. Thumb pain, right  XR HAND RIGHT (MIN 3 VIEWS)           Plan:    Recheck. BP  Spoke with Dr. Chris Chavez. Says she missed her appt there on the 18th and he is sending script for levothyroxine tonight as she is now becoming hypothyroid  Office will call her for f/u appt with Endo. Monitor BPs   Check uric acid for her thumb and take Aleve OTC for pain    She can bring FMLA paperwork here for the insomnia issue. REviewed Dr. Chip Martin note  Return in about 1 month (around 1/13/2023).     Orders Placed This Encounter   Procedures    XR HAND RIGHT (MIN 3 VIEWS)     Standing Status:   Future     Standing Expiration Date:   12/13/2023     Order Specific Question:   Reason for exam:     Answer:   pain    Uric Acid Standing Status:   Future     Standing Expiration Date:   12/13/2023       Orders Placed This Encounter   Medications    Suvorexant (BELSOMRA) 15 MG TABS     Sig: Take 15 mg by mouth at bedtime for 30 days. Dispense:  30 tablet     Refill:  2         Patient given educational materials - see patient instructions. Discussed use, benefit, and side effects of prescribed medications. All patient questions answered. Pt voiced understanding. Reviewed health maintenance. Instructed to continue current medications, diet and exercise. Patient agreed with treatment plan. Follow up as directed.      Electronically signed by Nikki Contreras PA-C on 12/13/2022 at 4:38 PM

## 2022-12-15 ENCOUNTER — TELEPHONE (OUTPATIENT)
Dept: PRIMARY CARE CLINIC | Age: 57
End: 2022-12-15

## 2022-12-15 DIAGNOSIS — G47.00 INSOMNIA, UNSPECIFIED TYPE: Primary | ICD-10-CM

## 2022-12-16 ENCOUNTER — HOSPITAL ENCOUNTER (OUTPATIENT)
Age: 57
Discharge: HOME | End: 2022-12-16
Payer: OTHER GOVERNMENT

## 2022-12-16 ENCOUNTER — HOSPITAL ENCOUNTER (OUTPATIENT)
Age: 57
End: 2022-12-16
Payer: OTHER GOVERNMENT

## 2022-12-16 ENCOUNTER — HOSPITAL ENCOUNTER (OUTPATIENT)
Dept: GENERAL RADIOLOGY | Age: 57
End: 2022-12-16
Payer: OTHER GOVERNMENT

## 2022-12-16 DIAGNOSIS — M79.644 THUMB PAIN, RIGHT: ICD-10-CM

## 2022-12-16 DIAGNOSIS — M79.89 THUMB SWELLING: ICD-10-CM

## 2022-12-16 LAB — URIC ACID: 4.3 MG/DL (ref 2.4–5.7)

## 2022-12-16 PROCEDURE — 36415 COLL VENOUS BLD VENIPUNCTURE: CPT

## 2022-12-16 PROCEDURE — 73130 X-RAY EXAM OF HAND: CPT

## 2022-12-16 PROCEDURE — 84550 ASSAY OF BLOOD/URIC ACID: CPT

## 2022-12-22 ENCOUNTER — OFFICE VISIT (OUTPATIENT)
Dept: PRIMARY CARE CLINIC | Age: 57
End: 2022-12-22
Payer: OTHER GOVERNMENT

## 2022-12-22 VITALS
SYSTOLIC BLOOD PRESSURE: 136 MMHG | OXYGEN SATURATION: 99 % | HEIGHT: 65 IN | DIASTOLIC BLOOD PRESSURE: 88 MMHG | BODY MASS INDEX: 26.82 KG/M2 | HEART RATE: 75 BPM | WEIGHT: 161 LBS

## 2022-12-22 DIAGNOSIS — G47.00 INSOMNIA, UNSPECIFIED TYPE: Primary | ICD-10-CM

## 2022-12-22 PROCEDURE — 99214 OFFICE O/P EST MOD 30 MIN: CPT | Performed by: PHYSICIAN ASSISTANT

## 2022-12-22 RX ORDER — ESZOPICLONE 2 MG/1
2 TABLET, FILM COATED ORAL NIGHTLY
Qty: 30 TABLET | Refills: 1 | Status: CANCELLED | OUTPATIENT
Start: 2022-12-22 | End: 2023-12-22

## 2022-12-22 RX ORDER — LEVOTHYROXINE SODIUM 112 UG/1
TABLET ORAL
COMMUNITY
Start: 2022-12-14

## 2022-12-22 RX ORDER — ESZOPICLONE 2 MG/1
2 TABLET, FILM COATED ORAL NIGHTLY
Qty: 30 TABLET | Refills: 1 | Status: SHIPPED | OUTPATIENT
Start: 2022-12-22 | End: 2023-12-22

## 2022-12-22 NOTE — PROGRESS NOTES
[facility] PRIMARY CARE  [address] [facility] [address]  Dept: 100 Valentín Pardo is a 49 y.o. female who presents today for her medical conditions/complaints as noted below. Chief Complaint   Patient presents with    Insomnia     Patient has FMLA forms for insomnia        HPI:     HPI  Had a sleep study in [facility]. Had LINUS diagnosed  LDL Cholesterol (mg/dL)   Date Value   07/29/2022 111   03/13/2019 127       (goal LDL is <100)   AST (U/L)   Date Value   07/29/2022 24     ALT (U/L)   Date Value   07/29/2022 21     BUN (mg/dL)   Date Value   12/09/2022 9     BP Readings from Last 3 Encounters:   12/22/22 136/88   12/13/22 (!) 136/92   08/16/22 (!) 144/94          (goal 120/80)    Past Medical History:   Diagnosis Date    Fibroid tumor       Past Surgical History:   Procedure Laterality Date    BREAST ENHANCEMENT SURGERY      HYSTERECTOMY (CERVIX STATUS UNKNOWN)      unsure of total    LASIK         Family History   Problem Relation Age of Onset    Diabetes Mother     Lung Cancer Father         mets to liver    Alcohol Abuse Father     Diabetes Sister        Social History     Tobacco Use    Smoking status: Never    Smokeless tobacco: Never   Substance Use Topics    Alcohol use: Not Currently      Current Outpatient Medications   Medication Sig Dispense Refill    levothyroxine (SYNTHROID) 112 MCG tablet       eszopiclone (LUNESTA) 2 MG TABS Take 1 tablet by mouth nightly. Max Daily Amount: 2 mg 30 tablet 1    zolpidem (AMBIEN) 5 MG tablet TAKE 1 TABLET BY MOUTH EVERY DAY AT BEDTIME (Patient not taking: Reported on 12/22/2022)      Suvorexant (BELSOMRA) 15 MG TABS Take 15 mg by mouth at bedtime for 30 days. (Patient not taking: Reported on 12/22/2022) 30 tablet 2     No current facility-administered medications for this visit. Allergies   Allergen Reactions    Unisom Sleepgels [Diphenhydramine Hcl] Other (See Comments)     Not effective.     Melatonin Other (See Comments)     Not effective. Health Maintenance   Topic Date Due    Flu vaccine (1) 08/01/2022    Shingles vaccine (1 of 2) 08/16/2023 (Originally 12/27/2017)    COVID-19 Vaccine (3 - Booster for Pfizer series) 08/16/2023 (Originally 10/1/2021)    Depression Screen  08/16/2023    Breast cancer screen  09/23/2024    Colorectal Cancer Screen  09/19/2025    Diabetes screen  12/09/2025    Lipids  07/29/2027    DTaP/Tdap/Td vaccine (2 - Td or Tdap) 03/13/2029    Hepatitis C screen  Completed    HIV screen  Completed    Hepatitis A vaccine  Aged Out    Hib vaccine  Aged Out    Meningococcal (ACWY) vaccine  Aged Out    Pneumococcal 0-64 years Vaccine  Aged Out       Subjective:      Review of Systems   Neurological:  Positive for dizziness and weakness. Psychiatric/Behavioral:  Positive for decreased concentration and sleep disturbance. Objective:     /88   Pulse 75   Ht 5' 5\" (1.651 m)   Wt 161 lb (73 kg)   SpO2 99%   BMI 26.79 kg/m²   Physical Exam  Vitals and nursing note reviewed. Psychiatric:         Mood and Affect: Mood normal.       Assessment:       Diagnosis Orders   1. Insomnia, unspecified type  eszopiclone (LUNESTA) 2 MG TABS           Plan:    Form completed for 6 months  Sleep Study --reminded to get done  Lunesta sent in--in place of Belsomra  Return in about 1 month (around 1/22/2023) for insomnia--1/2 hour. No orders of the defined types were placed in this encounter. Orders Placed This Encounter   Medications    eszopiclone (LUNESTA) 2 MG TABS     Sig: Take 1 tablet by mouth nightly. Max Daily Amount: 2 mg     Dispense:  30 tablet     Refill:  1         Patient given educational materials - see patient instructions. Discussed use, benefit, and side effects of prescribed medications. All patient questions answered. Pt voiced understanding. Reviewed health maintenance. Instructed to continue current medications, diet and exercise.   Patient agreed with treatment plan. Follow up as directed.      Electronically signed by Omkar Tolentino PA-C on 12/22/2022 at 9:37 AM

## 2023-01-24 ENCOUNTER — OFFICE VISIT (OUTPATIENT)
Dept: PRIMARY CARE CLINIC | Age: 58
End: 2023-01-24
Payer: OTHER GOVERNMENT

## 2023-01-24 VITALS
HEART RATE: 59 BPM | DIASTOLIC BLOOD PRESSURE: 86 MMHG | OXYGEN SATURATION: 99 % | WEIGHT: 163 LBS | SYSTOLIC BLOOD PRESSURE: 136 MMHG | BODY MASS INDEX: 27.16 KG/M2 | HEIGHT: 65 IN

## 2023-01-24 DIAGNOSIS — E89.0 POSTABLATIVE HYPOTHYROIDISM: Primary | ICD-10-CM

## 2023-01-24 DIAGNOSIS — G47.00 INSOMNIA, UNSPECIFIED TYPE: ICD-10-CM

## 2023-01-24 DIAGNOSIS — K59.00 CONSTIPATION, UNSPECIFIED CONSTIPATION TYPE: ICD-10-CM

## 2023-01-24 PROCEDURE — 99214 OFFICE O/P EST MOD 30 MIN: CPT | Performed by: PHYSICIAN ASSISTANT

## 2023-01-24 RX ORDER — ESZOPICLONE 2 MG/1
2 TABLET, FILM COATED ORAL NIGHTLY
Qty: 90 TABLET | Refills: 1 | Status: SHIPPED | OUTPATIENT
Start: 2023-01-24 | End: 2024-01-24

## 2023-01-24 ASSESSMENT — PATIENT HEALTH QUESTIONNAIRE - PHQ9
1. LITTLE INTEREST OR PLEASURE IN DOING THINGS: 0
SUM OF ALL RESPONSES TO PHQ QUESTIONS 1-9: 0
2. FEELING DOWN, DEPRESSED OR HOPELESS: 0
SUM OF ALL RESPONSES TO PHQ QUESTIONS 1-9: 0
SUM OF ALL RESPONSES TO PHQ9 QUESTIONS 1 & 2: 0

## 2023-01-24 ASSESSMENT — ENCOUNTER SYMPTOMS
RHINORRHEA: 0
COUGH: 0
VOMITING: 0
WHEEZING: 0
DIARRHEA: 0
EYE REDNESS: 0
SORE THROAT: 0
EYE DISCHARGE: 0
ABDOMINAL PAIN: 0
SHORTNESS OF BREATH: 0
NAUSEA: 0

## 2023-01-24 NOTE — PROGRESS NOTES
[facility] PRIMARY CARE  [address]  [facility] [address]  Dept: 100 Valentín Pardo is a 56 y.o. female who presents today for her medical conditions/complaints as noted below. Chief Complaint   Patient presents with    Insomnia     Patient said Danyelle Camarillo is helping    Medication Problem     Patient said thyroid is causing constipation        HPI:     HPI  Danyelle Camarillo is helping her insomnia. Uses 3 days a week on her days off so she gets rested well. She is much more productive at work now    Never had follow up with Dr. Camryn Perez and she shows me that she just received a cancellation from his office while in my waiting room. It has been 2 weeks since a BM. Has been using Miralax for 2 weeks and no BM. Reviewed BP readings and they are running much lower at home.  See scanned readings    LDL Cholesterol (mg/dL)   Date Value   07/29/2022 111   03/13/2019 127       (goal LDL is <100)   AST (U/L)   Date Value   07/29/2022 24     ALT (U/L)   Date Value   07/29/2022 21     BUN (mg/dL)   Date Value   12/09/2022 9     BP Readings from Last 3 Encounters:   01/24/23 136/86   12/22/22 136/88   12/13/22 (!) 136/92          (goal 120/80)    Past Medical History:   Diagnosis Date    Fibroid tumor       Past Surgical History:   Procedure Laterality Date    BREAST ENHANCEMENT SURGERY      HYSTERECTOMY (CERVIX STATUS UNKNOWN)      unsure of total    LASIK         Family History   Problem Relation Age of Onset    Diabetes Mother     Lung Cancer Father         mets to liver    Alcohol Abuse Father     Diabetes Sister        Social History     Tobacco Use    Smoking status: Never    Smokeless tobacco: Never   Substance Use Topics    Alcohol use: Not Currently      Current Outpatient Medications   Medication Sig Dispense Refill    Polyethylene Glycol 3350 (MIRALAX PO) Take by mouth      levothyroxine (SYNTHROID) 112 MCG tablet       eszopiclone (LUNESTA) 2 MG TABS Take 1 tablet by mouth nightly. Max Daily Amount: 2 mg 30 tablet 1     No current facility-administered medications for this visit. Allergies   Allergen Reactions    Unisom Sleepgels [Diphenhydramine Hcl] Other (See Comments)     Not effective. Melatonin Other (See Comments)     Not effective. Health Maintenance   Topic Date Due    Flu vaccine (1) 08/01/2022    Shingles vaccine (1 of 2) 08/16/2023 (Originally 12/27/2017)    COVID-19 Vaccine (3 - Booster for Pfizer series) 08/16/2023 (Originally 10/1/2021)    Depression Screen  08/16/2023    Breast cancer screen  09/23/2024    Colorectal Cancer Screen  09/19/2025    Diabetes screen  12/09/2025    Lipids  07/29/2027    DTaP/Tdap/Td vaccine (2 - Td or Tdap) 03/13/2029    Hepatitis C screen  Completed    HIV screen  Completed    Hepatitis A vaccine  Aged Out    Hib vaccine  Aged Out    Meningococcal (ACWY) vaccine  Aged Out    Pneumococcal 0-64 years Vaccine  Aged Out       Subjective:      Review of Systems   Constitutional:  Negative for chills and fever. HENT:  Negative for rhinorrhea and sore throat. Eyes:  Negative for discharge and redness. Respiratory:  Negative for cough, shortness of breath and wheezing. Cardiovascular:  Negative for chest pain and palpitations. Gastrointestinal:  Negative for abdominal pain, diarrhea, nausea and vomiting. Genitourinary:  Negative for dysuria and frequency. Musculoskeletal:  Negative for arthralgias and myalgias. Neurological:  Negative for dizziness, light-headedness and headaches. Psychiatric/Behavioral:  Negative for sleep disturbance. Objective:     /86   Pulse 59   Ht 5' 5\" (1.651 m)   Wt 163 lb (73.9 kg)   SpO2 99%   BMI 27.12 kg/m²   Physical Exam  Vitals and nursing note reviewed. Constitutional:       General: She is not in acute distress. Appearance: She is well-developed. She is not ill-appearing. HENT:      Head: Normocephalic and atraumatic.       Right Ear: External ear normal.      Left Ear: External ear normal.   Eyes:      General: No scleral icterus. Right eye: No discharge. Left eye: No discharge. Conjunctiva/sclera: Conjunctivae normal.   Neck:      Thyroid: No thyromegaly. Trachea: No tracheal deviation. Cardiovascular:      Rate and Rhythm: Normal rate and regular rhythm. Heart sounds: Normal heart sounds. Pulmonary:      Effort: Pulmonary effort is normal. No respiratory distress. Breath sounds: Normal breath sounds. No wheezing. Lymphadenopathy:      Cervical: No cervical adenopathy. Skin:     General: Skin is warm. Findings: No rash. Neurological:      Mental Status: She is alert and oriented to person, place, and time. Psychiatric:         Mood and Affect: Mood normal.         Behavior: Behavior normal.         Thought Content: Thought content normal.       Assessment:       Diagnosis Orders   1. Postablative hypothyroidism        2. Insomnia, unspecified type        3. Constipation, unspecified constipation type             Plan:    UDS and contract for the Lunesta  Reschedule Endo appt which was just canceled. MA to call and see if she missed her appt. Was canceled since they just joined [employer]. She was rescheduled for 2/24/23  Enema or Mag Citrate now for constipation  Then use prune juice regularly and Colace if needed. Return in about 6 months (around 7/24/2023) for recheck--1/2 hour. No orders of the defined types were placed in this encounter. No orders of the defined types were placed in this encounter. Patient given educational materials - see patient instructions. Discussed use, benefit, and side effects of prescribed medications. All patient questions answered. Pt voiced understanding. Reviewed health maintenance. Instructed to continue current medications, diet and exercise. Patient agreed with treatment plan. Follow up as directed.      Electronically signed by Chavez Nettles PA-C on 1/24/2023 at 3:39 PM

## 2023-07-21 ENCOUNTER — OFFICE VISIT (OUTPATIENT)
Dept: PRIMARY CARE CLINIC | Age: 58
End: 2023-07-21
Payer: OTHER GOVERNMENT

## 2023-07-21 VITALS
DIASTOLIC BLOOD PRESSURE: 82 MMHG | WEIGHT: 178 LBS | HEIGHT: 65 IN | SYSTOLIC BLOOD PRESSURE: 134 MMHG | OXYGEN SATURATION: 96 % | HEART RATE: 75 BPM | BODY MASS INDEX: 29.66 KG/M2

## 2023-07-21 DIAGNOSIS — Z00.00 HEALTH CARE MAINTENANCE: ICD-10-CM

## 2023-07-21 DIAGNOSIS — E89.0 POSTABLATIVE HYPOTHYROIDISM: Primary | ICD-10-CM

## 2023-07-21 DIAGNOSIS — N95.1 PERIMENOPAUSE: ICD-10-CM

## 2023-07-21 DIAGNOSIS — Z12.31 ENCOUNTER FOR SCREENING MAMMOGRAM FOR BREAST CANCER: ICD-10-CM

## 2023-07-21 PROCEDURE — 99213 OFFICE O/P EST LOW 20 MIN: CPT | Performed by: PHYSICIAN ASSISTANT

## 2023-07-21 RX ORDER — CYCLOBENZAPRINE HCL 5 MG
TABLET ORAL
COMMUNITY
Start: 2023-05-09

## 2023-07-21 ASSESSMENT — ENCOUNTER SYMPTOMS
RHINORRHEA: 0
CONSTIPATION: 0
SORE THROAT: 0
NAUSEA: 0
WHEEZING: 0
ABDOMINAL PAIN: 0
SHORTNESS OF BREATH: 0
DIARRHEA: 0
EYE REDNESS: 0
COUGH: 0
EYE DISCHARGE: 0
VOMITING: 0

## 2023-07-21 NOTE — PROGRESS NOTES
[address] PRIMARY CARE  [address] [address]  [facility] [address]  Dept: 1131 [address]. Jana Gardiner is a 56 y.o. female who presents today for her medical conditions/complaints as noted below. Chief Complaint   Patient presents with    Thyroid Problem     6 month f/u for thyroid medication. HPI:     Thyroid Problem  Patient reports no anxiety, cold intolerance, constipation, diarrhea, fatigue, heat intolerance or palpitations. Supposed to see Endo for thyroid, Dr. Jannie Koyanagi  She says they canceled on her 5 times but I do see refills from 1/23 from his office    She is gaining weight--no HFs, NS or other menopause sxs. LDL Cholesterol (mg/dL)   Date Value   07/29/2022 111   03/13/2019 127       (goal LDL is <100)   AST (U/L)   Date Value   07/29/2022 24     ALT (U/L)   Date Value   07/29/2022 21     BUN (mg/dL)   Date Value   12/09/2022 9     BP Readings from Last 3 Encounters:   07/21/23 134/82   01/24/23 136/86   12/22/22 136/88          (goal 120/80)    Past Medical History:   Diagnosis Date    Fibroid tumor       Past Surgical History:   Procedure Laterality Date    BREAST ENHANCEMENT SURGERY      HYSTERECTOMY (CERVIX STATUS UNKNOWN)      unsure of total    LASIK         Family History   Problem Relation Age of Onset    Diabetes Mother     Lung Cancer Father         mets to liver    Alcohol Abuse Father     Diabetes Sister        Social History     Tobacco Use    Smoking status: Never    Smokeless tobacco: Never   Substance Use Topics    Alcohol use: Not Currently      Current Outpatient Medications   Medication Sig Dispense Refill    cyclobenzaprine (FLEXERIL) 5 MG tablet TAKE 1 TABLET BY MOUTH 3 TIMES DAILY AS NEEDED      Polyethylene Glycol 3350 (MIRALAX PO) Take by mouth      eszopiclone (LUNESTA) 2 MG TABS Take 1 tablet by mouth nightly.  Max Daily Amount: 2 mg 90 tablet 1    levothyroxine (SYNTHROID) 112 MCG tablet        No current

## 2023-08-02 ENCOUNTER — HOSPITAL ENCOUNTER (OUTPATIENT)
Age: 58
Discharge: HOME | End: 2023-08-02
Payer: OTHER GOVERNMENT

## 2023-08-02 DIAGNOSIS — N95.1 PERIMENOPAUSE: ICD-10-CM

## 2023-08-02 DIAGNOSIS — Z00.00 HEALTH CARE MAINTENANCE: ICD-10-CM

## 2023-08-02 LAB
ANION GAP SERPL CALCULATED.3IONS-SCNC: 9 MMOL/L (ref 9–17)
BUN SERPL-MCNC: 10 MG/DL (ref 6–20)
CALCIUM SERPL-MCNC: 8.9 MG/DL (ref 8.6–10.4)
CHLORIDE SERPL-SCNC: 107 MMOL/L (ref 98–107)
CO2 SERPL-SCNC: 24 MMOL/L (ref 20–31)
CREAT SERPL-MCNC: 0.5 MG/DL (ref 0.5–0.9)
FSH SERPL-ACNC: 61.2 MIU/ML (ref 25.8–134.8)
GFR SERPL CREATININE-BSD FRML MDRD: >60 ML/MIN/1.73M2
GLUCOSE P FAST SERPL-MCNC: 102 MG/DL (ref 70–99)
LH SERPL-ACNC: 38.4 MIU/ML (ref 7.7–58.5)
POTASSIUM SERPL-SCNC: 3.8 MMOL/L (ref 3.7–5.3)
SODIUM SERPL-SCNC: 140 MMOL/L (ref 135–144)

## 2023-08-02 PROCEDURE — 83002 ASSAY OF GONADOTROPIN (LH): CPT

## 2023-08-02 PROCEDURE — 83001 ASSAY OF GONADOTROPIN (FSH): CPT

## 2023-08-02 PROCEDURE — 80048 BASIC METABOLIC PNL TOTAL CA: CPT

## 2023-08-02 PROCEDURE — 36415 COLL VENOUS BLD VENIPUNCTURE: CPT

## 2023-08-07 DIAGNOSIS — G47.00 INSOMNIA, UNSPECIFIED TYPE: ICD-10-CM

## 2023-08-07 RX ORDER — ESZOPICLONE 2 MG/1
2 TABLET, FILM COATED ORAL NIGHTLY
Qty: 90 TABLET | Refills: 0 | Status: SHIPPED | OUTPATIENT
Start: 2023-08-07 | End: 2024-08-06

## 2023-08-24 ENCOUNTER — HOSPITAL ENCOUNTER (OUTPATIENT)
Age: 58
Discharge: HOME | End: 2023-08-24
Payer: OTHER GOVERNMENT

## 2023-08-24 LAB
EST. AVERAGE GLUCOSE BLD GHB EST-MCNC: 114 MG/DL
GLUCOSE P FAST SERPL-MCNC: 101 MG/DL (ref 70–99)
HBA1C MFR BLD: 5.6 % (ref 4–6)
INSULIN COMMENT: NORMAL
INSULIN REFERENCE RANGE:: NORMAL
INSULIN: 12.6 MU/L
T4 FREE SERPL-MCNC: 1.8 NG/DL (ref 0.9–1.7)
TSH SERPL DL<=0.05 MIU/L-ACNC: 0.04 UIU/ML (ref 0.3–5)

## 2023-08-24 PROCEDURE — 86376 MICROSOMAL ANTIBODY EACH: CPT

## 2023-08-24 PROCEDURE — 86800 THYROGLOBULIN ANTIBODY: CPT

## 2023-08-24 PROCEDURE — 82947 ASSAY GLUCOSE BLOOD QUANT: CPT

## 2023-08-24 PROCEDURE — 36415 COLL VENOUS BLD VENIPUNCTURE: CPT

## 2023-08-24 PROCEDURE — 84439 ASSAY OF FREE THYROXINE: CPT

## 2023-08-24 PROCEDURE — 84443 ASSAY THYROID STIM HORMONE: CPT

## 2023-08-24 PROCEDURE — 83525 ASSAY OF INSULIN: CPT

## 2023-08-24 PROCEDURE — 83036 HEMOGLOBIN GLYCOSYLATED A1C: CPT

## 2023-08-25 LAB
THYROGLOBULIN AB: >4794 IU/ML (ref 0–40)
THYROPEROXIDASE AB SERPL IA-ACNC: 409 IU/ML (ref 0–25)

## 2023-08-31 ENCOUNTER — HOSPITAL ENCOUNTER (OUTPATIENT)
Dept: ULTRASOUND IMAGING | Age: 58
Discharge: HOME | End: 2023-09-02
Attending: INTERNAL MEDICINE
Payer: OTHER GOVERNMENT

## 2023-08-31 DIAGNOSIS — E89.0 POSTSURGICAL HYPOTHYROIDISM: ICD-10-CM

## 2023-08-31 PROCEDURE — 76536 US EXAM OF HEAD AND NECK: CPT

## 2023-11-15 ENCOUNTER — HOSPITAL ENCOUNTER (OUTPATIENT)
Age: 58
Discharge: HOME | End: 2023-11-15
Payer: OTHER GOVERNMENT

## 2023-11-15 LAB
EST. AVERAGE GLUCOSE BLD GHB EST-MCNC: 108 MG/DL
GLUCOSE SERPL-MCNC: 90 MG/DL (ref 70–99)
HBA1C MFR BLD: 5.4 % (ref 4–6)
INSULIN COMMENT: NORMAL
INSULIN REFERENCE RANGE:: NORMAL
INSULIN: 12.7 MU/L
T4 FREE SERPL-MCNC: 1.3 NG/DL (ref 0.9–1.7)
TSH SERPL DL<=0.05 MIU/L-ACNC: 0.39 UIU/ML (ref 0.3–5)

## 2023-11-15 PROCEDURE — 84443 ASSAY THYROID STIM HORMONE: CPT

## 2023-11-15 PROCEDURE — 83036 HEMOGLOBIN GLYCOSYLATED A1C: CPT

## 2023-11-15 PROCEDURE — 83525 ASSAY OF INSULIN: CPT

## 2023-11-15 PROCEDURE — 36415 COLL VENOUS BLD VENIPUNCTURE: CPT

## 2023-11-15 PROCEDURE — 84439 ASSAY OF FREE THYROXINE: CPT

## 2023-11-15 PROCEDURE — 82947 ASSAY GLUCOSE BLOOD QUANT: CPT

## 2024-01-03 DIAGNOSIS — G47.00 INSOMNIA, UNSPECIFIED TYPE: ICD-10-CM

## 2024-01-03 RX ORDER — ESZOPICLONE 2 MG/1
2 TABLET, FILM COATED ORAL NIGHTLY
Qty: 90 TABLET | Refills: 0 | Status: SHIPPED | OUTPATIENT
Start: 2024-01-03 | End: 2025-01-02

## 2024-05-10 ENCOUNTER — HOSPITAL ENCOUNTER (OUTPATIENT)
Age: 59
Discharge: HOME | End: 2024-05-10

## 2024-05-10 LAB
T4 FREE SERPL-MCNC: 1.9 NG/DL (ref 0.9–1.7)
TSH SERPL DL<=0.05 MIU/L-ACNC: 0.12 UIU/ML (ref 0.3–5)

## 2024-05-10 PROCEDURE — 84439 ASSAY OF FREE THYROXINE: CPT

## 2024-05-10 PROCEDURE — 36415 COLL VENOUS BLD VENIPUNCTURE: CPT

## 2024-05-10 PROCEDURE — 84443 ASSAY THYROID STIM HORMONE: CPT

## 2024-07-02 DIAGNOSIS — G47.00 INSOMNIA, UNSPECIFIED TYPE: ICD-10-CM

## 2024-07-03 RX ORDER — ESZOPICLONE 2 MG/1
2 TABLET, FILM COATED ORAL NIGHTLY
Qty: 90 TABLET | Refills: 0 | Status: SHIPPED | OUTPATIENT
Start: 2024-07-03 | End: 2025-07-03

## 2024-07-10 ENCOUNTER — TELEPHONE (OUTPATIENT)
Dept: PRIMARY CARE CLINIC | Age: 59
End: 2024-07-10

## 2024-07-10 NOTE — TELEPHONE ENCOUNTER
Left a voicemail asking patient to come in at 2 instead of 2:15 due to needing more time for appointment
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
## 2024-07-15 ENCOUNTER — OFFICE VISIT (OUTPATIENT)
Dept: PRIMARY CARE CLINIC | Age: 59
End: 2024-07-15
Payer: OTHER GOVERNMENT

## 2024-07-15 VITALS
WEIGHT: 181 LBS | OXYGEN SATURATION: 99 % | BODY MASS INDEX: 30.16 KG/M2 | HEIGHT: 65 IN | SYSTOLIC BLOOD PRESSURE: 138 MMHG | HEART RATE: 71 BPM | DIASTOLIC BLOOD PRESSURE: 80 MMHG

## 2024-07-15 DIAGNOSIS — E89.0 POSTABLATIVE HYPOTHYROIDISM: ICD-10-CM

## 2024-07-15 DIAGNOSIS — Z23 NEED FOR VACCINATION: ICD-10-CM

## 2024-07-15 DIAGNOSIS — K64.9 HEMORRHOIDS, UNSPECIFIED HEMORRHOID TYPE: ICD-10-CM

## 2024-07-15 DIAGNOSIS — Z00.00 HEALTH CARE MAINTENANCE: Primary | ICD-10-CM

## 2024-07-15 PROCEDURE — 99396 PREV VISIT EST AGE 40-64: CPT | Performed by: PHYSICIAN ASSISTANT

## 2024-07-15 RX ORDER — HYDROCORTISONE 25 MG/G
CREAM TOPICAL
Qty: 1 EACH | Refills: 1 | Status: SHIPPED | OUTPATIENT
Start: 2024-07-15

## 2024-07-15 SDOH — ECONOMIC STABILITY: FOOD INSECURITY: WITHIN THE PAST 12 MONTHS, THE FOOD YOU BOUGHT JUST DIDN'T LAST AND YOU DIDN'T HAVE MONEY TO GET MORE.: NEVER TRUE

## 2024-07-15 SDOH — ECONOMIC STABILITY: FOOD INSECURITY: WITHIN THE PAST 12 MONTHS, YOU WORRIED THAT YOUR FOOD WOULD RUN OUT BEFORE YOU GOT MONEY TO BUY MORE.: NEVER TRUE

## 2024-07-15 SDOH — ECONOMIC STABILITY: HOUSING INSECURITY
IN THE LAST 12 MONTHS, WAS THERE A TIME WHEN YOU DID NOT HAVE A STEADY PLACE TO SLEEP OR SLEPT IN A SHELTER (INCLUDING NOW)?: NO

## 2024-07-15 SDOH — ECONOMIC STABILITY: INCOME INSECURITY: HOW HARD IS IT FOR YOU TO PAY FOR THE VERY BASICS LIKE FOOD, HOUSING, MEDICAL CARE, AND HEATING?: NOT HARD AT ALL

## 2024-07-15 ASSESSMENT — PATIENT HEALTH QUESTIONNAIRE - PHQ9
SUM OF ALL RESPONSES TO PHQ QUESTIONS 1-9: 0
SUM OF ALL RESPONSES TO PHQ9 QUESTIONS 1 & 2: 0
SUM OF ALL RESPONSES TO PHQ QUESTIONS 1-9: 0
SUM OF ALL RESPONSES TO PHQ QUESTIONS 1-9: 0
1. LITTLE INTEREST OR PLEASURE IN DOING THINGS: NOT AT ALL
SUM OF ALL RESPONSES TO PHQ QUESTIONS 1-9: 0
2. FEELING DOWN, DEPRESSED OR HOPELESS: NOT AT ALL

## 2024-07-19 ENCOUNTER — HOSPITAL ENCOUNTER (OUTPATIENT)
Age: 59
Discharge: HOME | End: 2024-07-19
Payer: OTHER GOVERNMENT

## 2024-07-19 DIAGNOSIS — Z23 NEED FOR VACCINATION: ICD-10-CM

## 2024-07-19 DIAGNOSIS — Z00.00 HEALTH CARE MAINTENANCE: ICD-10-CM

## 2024-07-19 LAB
ALT SERPL-CCNC: 13 U/L (ref 5–33)
AST SERPL-CCNC: 13 U/L
CHOLEST SERPL-MCNC: 247 MG/DL
CHOLESTEROL/HDL RATIO: 2.6
HDLC SERPL-MCNC: 96 MG/DL
LDLC SERPL CALC-MCNC: 140 MG/DL (ref 0–130)
TRIGL SERPL-MCNC: 55 MG/DL

## 2024-07-19 PROCEDURE — 84460 ALANINE AMINO (ALT) (SGPT): CPT

## 2024-07-19 PROCEDURE — 84450 TRANSFERASE (AST) (SGOT): CPT

## 2024-07-19 PROCEDURE — 36415 COLL VENOUS BLD VENIPUNCTURE: CPT

## 2024-07-19 PROCEDURE — 80061 LIPID PANEL: CPT

## 2024-07-19 PROCEDURE — 86787 VARICELLA-ZOSTER ANTIBODY: CPT

## 2024-07-22 LAB — VZV IGG SER QL IA: 1.22

## 2024-08-19 ENCOUNTER — HOSPITAL ENCOUNTER (OUTPATIENT)
Age: 59
Discharge: HOME | End: 2024-08-19
Payer: OTHER GOVERNMENT

## 2024-08-19 LAB
T4 FREE SERPL-MCNC: 1.3 NG/DL (ref 0.9–1.7)
TSH SERPL DL<=0.05 MIU/L-ACNC: 2.4 UIU/ML (ref 0.3–5)

## 2024-08-19 PROCEDURE — 84439 ASSAY OF FREE THYROXINE: CPT

## 2024-08-19 PROCEDURE — 36415 COLL VENOUS BLD VENIPUNCTURE: CPT

## 2024-08-19 PROCEDURE — 84443 ASSAY THYROID STIM HORMONE: CPT

## 2024-09-23 ASSESSMENT — ENCOUNTER SYMPTOMS
RESPIRATORY NEGATIVE: 1
NAUSEA: 0
ABDOMINAL PAIN: 0
VOMITING: 0
BACK PAIN: 0

## 2024-09-24 ENCOUNTER — OFFICE VISIT (OUTPATIENT)
Dept: PRIMARY CARE CLINIC | Age: 59
End: 2024-09-24
Payer: OTHER GOVERNMENT

## 2024-09-24 ENCOUNTER — HOSPITAL ENCOUNTER (OUTPATIENT)
Age: 59
Setting detail: SPECIMEN
Discharge: HOME | End: 2024-09-24

## 2024-09-24 VITALS
DIASTOLIC BLOOD PRESSURE: 80 MMHG | TEMPERATURE: 97.9 F | HEART RATE: 70 BPM | OXYGEN SATURATION: 99 % | BODY MASS INDEX: 30.89 KG/M2 | SYSTOLIC BLOOD PRESSURE: 122 MMHG | WEIGHT: 185.4 LBS | HEIGHT: 65 IN

## 2024-09-24 DIAGNOSIS — R82.90 ABNORMAL URINE ODOR: ICD-10-CM

## 2024-09-24 DIAGNOSIS — R82.90 ABNORMAL URINE ODOR: Primary | ICD-10-CM

## 2024-09-24 DIAGNOSIS — E66.811 CLASS 1 OBESITY WITHOUT SERIOUS COMORBIDITY WITH BODY MASS INDEX (BMI) OF 30.0 TO 30.9 IN ADULT, UNSPECIFIED OBESITY TYPE: ICD-10-CM

## 2024-09-24 DIAGNOSIS — E66.9 CLASS 1 OBESITY WITHOUT SERIOUS COMORBIDITY WITH BODY MASS INDEX (BMI) OF 30.0 TO 30.9 IN ADULT, UNSPECIFIED OBESITY TYPE: ICD-10-CM

## 2024-09-24 DIAGNOSIS — Z12.31 ENCOUNTER FOR SCREENING MAMMOGRAM FOR BREAST CANCER: ICD-10-CM

## 2024-09-24 LAB
ANION GAP SERPL CALCULATED.3IONS-SCNC: 10 MMOL/L (ref 9–16)
BILIRUBIN, POC: NEGATIVE
BLOOD URINE, POC: NORMAL
BUN BLDV-MCNC: 10 MG/DL (ref 6–20)
CALCIUM SERPL-MCNC: 8.6 MG/DL (ref 8.6–10.4)
CHLORIDE BLD-SCNC: 105 MMOL/L (ref 98–107)
CLARITY, POC: NORMAL
CO2: 25 MMOL/L (ref 20–31)
COLOR, POC: NORMAL
CREAT SERPL-MCNC: 0.7 MG/DL (ref 0.5–0.9)
ESTIMATED AVERAGE GLUCOSE: 108 MG/DL
GFR, ESTIMATED: >90 ML/MIN/1.73M2
GLUCOSE FASTING: 104 MG/DL (ref 74–99)
GLUCOSE URINE, POC: NEGATIVE MG/DL
HBA1C MFR BLD: 5.4 % (ref 4–6)
KETONES, POC: NEGATIVE MG/DL
LEUKOCYTE EST, POC: NORMAL
NITRITE, POC: NEGATIVE
PH, POC: 5.5
POTASSIUM SERPL-SCNC: 4.3 MMOL/L (ref 3.7–5.3)
PROTEIN, POC: NORMAL MG/DL
SODIUM BLD-SCNC: 140 MMOL/L (ref 136–145)
SPECIFIC GRAVITY, POC: 1.02
UROBILINOGEN, POC: 0.2 MG/DL

## 2024-09-24 PROCEDURE — 99213 OFFICE O/P EST LOW 20 MIN: CPT | Performed by: PHYSICIAN ASSISTANT

## 2024-09-24 PROCEDURE — 81003 URINALYSIS AUTO W/O SCOPE: CPT | Performed by: PHYSICIAN ASSISTANT

## 2024-09-26 ENCOUNTER — TELEPHONE (OUTPATIENT)
Dept: PRIMARY CARE CLINIC | Age: 59
End: 2024-09-26

## 2024-09-26 DIAGNOSIS — N30.00 ACUTE CYSTITIS WITHOUT HEMATURIA: Primary | ICD-10-CM

## 2024-09-26 LAB
MICROORGANISM SPEC CULT: ABNORMAL
SERVICE CMNT-IMP: ABNORMAL
SPECIMEN DESCRIPTION: ABNORMAL

## 2024-09-26 RX ORDER — AMOXICILLIN 500 MG/1
500 CAPSULE ORAL 3 TIMES DAILY
Qty: 9 CAPSULE | Refills: 0 | Status: SHIPPED | OUTPATIENT
Start: 2024-09-26 | End: 2024-09-29

## 2025-05-19 ENCOUNTER — HOSPITAL ENCOUNTER (OUTPATIENT)
Age: 60
Discharge: HOME | End: 2025-05-19
Payer: OTHER GOVERNMENT

## 2025-05-19 LAB
T4 FREE SERPL-MCNC: 1.2 NG/DL (ref 0.9–1.7)
TSH SERPL DL<=0.05 MIU/L-ACNC: 3.82 UIU/ML (ref 0.27–4.2)

## 2025-05-19 PROCEDURE — 84443 ASSAY THYROID STIM HORMONE: CPT

## 2025-05-19 PROCEDURE — 84439 ASSAY OF FREE THYROXINE: CPT

## 2025-05-19 PROCEDURE — 36415 COLL VENOUS BLD VENIPUNCTURE: CPT

## 2025-05-20 ENCOUNTER — TELEPHONE (OUTPATIENT)
Dept: PRIMARY CARE CLINIC | Age: 60
End: 2025-05-20

## 2025-05-20 NOTE — TELEPHONE ENCOUNTER
Call placed to assist patient with scheduling.  No answer.  LM to call office to schedule or do so on [employer].

## 2025-05-20 NOTE — TELEPHONE ENCOUNTER
----- Message from Jose Alejandro GRAF sent at 5/20/2025  3:07 PM EDT -----  Regarding: [facility] Appointment Request  [facility] Appointment Request    Patient needs appointment for [facility] Appointment Type: Annual Physical    Patient Requested Dates(s): May 27, 2025  Patient Requested Time: 2PM   Provider Name: Viridiana Painting PA-C    Reason for Appointment Request: Established Patient - No appointments available during search  --------------------------------------------------------------------------------------------------------------------------    Relationship to Patient: Self     Call Back Information: OK to leave message on voicemail  Preferred Call Back Number: Phone  [phone]

## 2025-06-02 SDOH — ECONOMIC STABILITY: FOOD INSECURITY: WITHIN THE PAST 12 MONTHS, THE FOOD YOU BOUGHT JUST DIDN'T LAST AND YOU DIDN'T HAVE MONEY TO GET MORE.: NEVER TRUE

## 2025-06-02 SDOH — ECONOMIC STABILITY: INCOME INSECURITY: IN THE LAST 12 MONTHS, WAS THERE A TIME WHEN YOU WERE NOT ABLE TO PAY THE MORTGAGE OR RENT ON TIME?: NO

## 2025-06-02 SDOH — ECONOMIC STABILITY: TRANSPORTATION INSECURITY
IN THE PAST 12 MONTHS, HAS LACK OF TRANSPORTATION KEPT YOU FROM MEETINGS, WORK, OR FROM GETTING THINGS NEEDED FOR DAILY LIVING?: NO

## 2025-06-02 SDOH — ECONOMIC STABILITY: FOOD INSECURITY: WITHIN THE PAST 12 MONTHS, YOU WORRIED THAT YOUR FOOD WOULD RUN OUT BEFORE YOU GOT MONEY TO BUY MORE.: NEVER TRUE

## 2025-06-02 SDOH — ECONOMIC STABILITY: TRANSPORTATION INSECURITY
IN THE PAST 12 MONTHS, HAS THE LACK OF TRANSPORTATION KEPT YOU FROM MEDICAL APPOINTMENTS OR FROM GETTING MEDICATIONS?: NO

## 2025-06-02 ASSESSMENT — PATIENT HEALTH QUESTIONNAIRE - PHQ9
SUM OF ALL RESPONSES TO PHQ9 QUESTIONS 1 & 2: 0
SUM OF ALL RESPONSES TO PHQ QUESTIONS 1-9: 0
2. FEELING DOWN, DEPRESSED OR HOPELESS: NOT AT ALL
1. LITTLE INTEREST OR PLEASURE IN DOING THINGS: NOT AT ALL
2. FEELING DOWN, DEPRESSED OR HOPELESS: NOT AT ALL
1. LITTLE INTEREST OR PLEASURE IN DOING THINGS: NOT AT ALL

## 2025-06-03 ENCOUNTER — OFFICE VISIT (OUTPATIENT)
Dept: PRIMARY CARE CLINIC | Age: 60
End: 2025-06-03
Payer: OTHER GOVERNMENT

## 2025-06-03 VITALS
BODY MASS INDEX: 31.82 KG/M2 | SYSTOLIC BLOOD PRESSURE: 126 MMHG | DIASTOLIC BLOOD PRESSURE: 80 MMHG | HEIGHT: 65 IN | OXYGEN SATURATION: 97 % | HEART RATE: 72 BPM | WEIGHT: 191 LBS

## 2025-06-03 DIAGNOSIS — J45.20 MILD INTERMITTENT REACTIVE AIRWAY DISEASE WITHOUT COMPLICATION: ICD-10-CM

## 2025-06-03 DIAGNOSIS — Z12.31 ENCOUNTER FOR SCREENING MAMMOGRAM FOR BREAST CANCER: ICD-10-CM

## 2025-06-03 DIAGNOSIS — Z00.00 HEALTH CARE MAINTENANCE: Primary | ICD-10-CM

## 2025-06-03 DIAGNOSIS — E89.0 POSTABLATIVE HYPOTHYROIDISM: ICD-10-CM

## 2025-06-03 DIAGNOSIS — Z12.11 COLON CANCER SCREENING: ICD-10-CM

## 2025-06-03 PROCEDURE — 99396 PREV VISIT EST AGE 40-64: CPT | Performed by: PHYSICIAN ASSISTANT

## 2025-06-03 RX ORDER — LEVOTHYROXINE SODIUM 100 UG/1
TABLET ORAL
COMMUNITY
Start: 2025-04-22

## 2025-06-03 RX ORDER — ALBUTEROL SULFATE AND BUDESONIDE 90; 80 UG/1; UG/1
2 AEROSOL, METERED RESPIRATORY (INHALATION) EVERY 4 HOURS PRN
Qty: 1 G | Refills: 2 | Status: SHIPPED | OUTPATIENT
Start: 2025-06-03

## 2025-06-03 ASSESSMENT — ENCOUNTER SYMPTOMS
DIARRHEA: 0
VOMITING: 0
SHORTNESS OF BREATH: 0
EYE PAIN: 0
EYE ITCHING: 0
ABDOMINAL PAIN: 0
CHEST TIGHTNESS: 0
COUGH: 0
VOICE CHANGE: 0
NAUSEA: 0
CONSTIPATION: 0
BACK PAIN: 0
TROUBLE SWALLOWING: 0
BLOOD IN STOOL: 0

## 2025-06-03 NOTE — PROGRESS NOTES
[facility]  [facility] PRIMARY [facility]ARE  [address]  [place] [address]  Dept: [phone]    Liliana Leos is a 59 y.o. female who presents today for her medical conditions/complaints as noted below.      Chief Complaint   Patient presents with    Annual Exam     Patient said she is here for her annual exam.     Discuss Labs     Patient had labs done.        HPI:     HPI  Here for physical   She is happy with her current weight.  No concerns  Will be having breast implants  Does get chest tightness and wheezing when it is cold outside    No components found for: \"LDLCHOLESTEROL\", \"LDLCALC\"    (goal LDL is <100)   AST (U/L)   Date Value   07/19/2024 13     ALT (U/L)   Date Value   07/19/2024 13     BUN (mg/dL)   Date Value   09/24/2024 10     BP Readings from Last 3 Encounters:   06/03/25 126/80   09/24/24 122/80   07/15/24 138/80          (goal 120/80)    Past Medical History:   Diagnosis Date    Fibroid tumor       Past Surgical History:   Procedure Laterality Date    BREAST ENHANCEMENT SURGERY      COSMETIC SURGERY  January 28 2024    HYSTERECTOMY (CERVIX STATUS UNKNOWN)      unsure of total    LASIK         Family History   Problem Relation Age of Onset    Diabetes Mother     Lung Cancer Father         mets to liver    Alcohol Abuse Father     Diabetes Sister        Social History     Tobacco Use    Smoking status: Never    Smokeless tobacco: Never   Substance Use Topics    Alcohol use: Not Currently      Current Outpatient Medications   Medication Sig Dispense Refill    levothyroxine (SYNTHROID) 100 MCG tablet  (Patient taking differently: Takes 6 days a week, skips Sunday)      Albuterol-Budesonide (AIRSUPRA) 90-80 MCG/ACT AERO Inhale 2 puffs into the lungs every 4 hours as needed (wheezing) 1 g 2    levothyroxine (SYNTHROID) 112 MCG tablet  (Patient not taking: Reported on 6/3/2025)       No current facility-administered medications for this visit.     No Known

## 2025-06-06 ENCOUNTER — HOSPITAL ENCOUNTER (OUTPATIENT)
Age: 60
Discharge: HOME | End: 2025-06-06
Payer: OTHER GOVERNMENT

## 2025-06-06 DIAGNOSIS — Z00.00 HEALTH CARE MAINTENANCE: ICD-10-CM

## 2025-06-06 LAB
ALBUMIN SERPL-MCNC: 4.3 G/DL (ref 3.5–5.2)
ALP SERPL-CCNC: 109 U/L (ref 35–104)
ALT SERPL-CCNC: 13 U/L (ref 10–35)
ANION GAP SERPL CALCULATED.3IONS-SCNC: 11 MMOL/L (ref 9–16)
AST SERPL-CCNC: 15 U/L (ref 10–35)
BASOPHILS # BLD: 0.04 K/UL (ref 0–0.2)
BASOPHILS NFR BLD: 1 % (ref 0–2)
BILIRUB SERPL-MCNC: 0.4 MG/DL (ref 0–1.2)
BUN SERPL-MCNC: 11 MG/DL (ref 6–20)
CALCIUM SERPL-MCNC: 9.3 MG/DL (ref 8.6–10.4)
CHLORIDE SERPL-SCNC: 105 MMOL/L (ref 98–107)
CHOLEST SERPL-MCNC: 270 MG/DL (ref 0–199)
CHOLESTEROL/HDL RATIO: 2.6
CO2 SERPL-SCNC: 24 MMOL/L (ref 20–31)
CREAT SERPL-MCNC: 0.8 MG/DL (ref 0.7–1.2)
EOSINOPHIL # BLD: 0.2 K/UL (ref 0–0.44)
EOSINOPHILS RELATIVE PERCENT: 3 % (ref 0–4)
ERYTHROCYTE [DISTWIDTH] IN BLOOD BY AUTOMATED COUNT: 13.9 % (ref 11.5–14.9)
EST. AVERAGE GLUCOSE BLD GHB EST-MCNC: 114 MG/DL
GFR, ESTIMATED: 86 ML/MIN/1.73M2
GLUCOSE P FAST SERPL-MCNC: 112 MG/DL (ref 74–99)
HBA1C MFR BLD: 5.6 % (ref 4–6)
HCT VFR BLD AUTO: 40.4 % (ref 36–46)
HDLC SERPL-MCNC: 104 MG/DL
HGB BLD-MCNC: 13.4 G/DL (ref 12–16)
IMM GRANULOCYTES # BLD AUTO: <0.03 K/UL (ref 0–0.3)
IMM GRANULOCYTES NFR BLD: 0 %
LDLC SERPL CALC-MCNC: 153 MG/DL (ref 0–100)
LYMPHOCYTES NFR BLD: 1.76 K/UL (ref 1.1–3.7)
LYMPHOCYTES RELATIVE PERCENT: 28 % (ref 24–44)
MCH RBC QN AUTO: 28.9 PG (ref 26–34)
MCHC RBC AUTO-ENTMCNC: 33.2 G/DL (ref 31–37)
MCV RBC AUTO: 87.3 FL (ref 80–100)
MONOCYTES NFR BLD: 0.39 K/UL (ref 0.1–1.2)
MONOCYTES NFR BLD: 6 % (ref 3–12)
NEUTROPHILS NFR BLD: 62 % (ref 36–66)
NEUTS SEG NFR BLD: 3.95 K/UL (ref 1.5–8.1)
NRBC BLD-RTO: 0 PER 100 WBC
PLATELET # BLD AUTO: 268 K/UL (ref 150–450)
PMV BLD AUTO: 9.8 FL (ref 8–13.5)
POTASSIUM SERPL-SCNC: 3.9 MMOL/L (ref 3.7–5.3)
PROT SERPL-MCNC: 7.8 G/DL (ref 6.6–8.7)
RBC # BLD AUTO: 4.63 M/UL (ref 3.95–5.11)
SODIUM SERPL-SCNC: 140 MMOL/L (ref 136–145)
TRIGL SERPL-MCNC: 66 MG/DL (ref 0–149)
WBC OTHER # BLD: 6.4 K/UL (ref 3.5–11)

## 2025-06-06 PROCEDURE — 80061 LIPID PANEL: CPT

## 2025-06-06 PROCEDURE — 83036 HEMOGLOBIN GLYCOSYLATED A1C: CPT

## 2025-06-06 PROCEDURE — 36415 COLL VENOUS BLD VENIPUNCTURE: CPT

## 2025-06-06 PROCEDURE — 85025 COMPLETE CBC W/AUTO DIFF WBC: CPT

## 2025-06-06 PROCEDURE — 80053 COMPREHEN METABOLIC PANEL: CPT

## 2025-06-09 ENCOUNTER — RESULTS FOLLOW-UP (OUTPATIENT)
Dept: PRIMARY CARE CLINIC | Age: 60
End: 2025-06-09

## 2025-07-24 RX ORDER — LEVOTHYROXINE SODIUM 100 UG/1
TABLET ORAL
Qty: 30 TABLET | Refills: 0 | OUTPATIENT
Start: 2025-07-24